# Patient Record
Sex: FEMALE | Employment: FULL TIME | URBAN - METROPOLITAN AREA
[De-identification: names, ages, dates, MRNs, and addresses within clinical notes are randomized per-mention and may not be internally consistent; named-entity substitution may affect disease eponyms.]

---

## 2024-05-15 ENCOUNTER — APPOINTMENT (OUTPATIENT)
Dept: RADIOLOGY | Facility: MEDICAL CENTER | Age: 24
End: 2024-05-15
Attending: EMERGENCY MEDICINE
Payer: MEDICAID

## 2024-05-15 ENCOUNTER — HOSPITAL ENCOUNTER (EMERGENCY)
Facility: MEDICAL CENTER | Age: 24
End: 2024-05-15
Attending: EMERGENCY MEDICINE
Payer: MEDICAID

## 2024-05-15 VITALS
WEIGHT: 265.65 LBS | RESPIRATION RATE: 16 BRPM | TEMPERATURE: 97.5 F | SYSTOLIC BLOOD PRESSURE: 124 MMHG | HEART RATE: 74 BPM | DIASTOLIC BLOOD PRESSURE: 82 MMHG | OXYGEN SATURATION: 99 %

## 2024-05-15 DIAGNOSIS — N93.8 DYSFUNCTIONAL UTERINE BLEEDING: ICD-10-CM

## 2024-05-15 DIAGNOSIS — E28.2 POLYCYSTIC OVARIAN SYNDROME: ICD-10-CM

## 2024-05-15 LAB
ALBUMIN SERPL BCP-MCNC: 3.7 G/DL (ref 3.2–4.9)
ALBUMIN/GLOB SERPL: 1.3 G/DL
ALP SERPL-CCNC: 64 U/L (ref 30–99)
ALT SERPL-CCNC: 22 U/L (ref 2–50)
ANION GAP SERPL CALC-SCNC: 11 MMOL/L (ref 7–16)
AST SERPL-CCNC: 21 U/L (ref 12–45)
BASOPHILS # BLD AUTO: 0.6 % (ref 0–1.8)
BASOPHILS # BLD: 0.07 K/UL (ref 0–0.12)
BILIRUB SERPL-MCNC: <0.2 MG/DL (ref 0.1–1.5)
BUN SERPL-MCNC: 13 MG/DL (ref 8–22)
CALCIUM ALBUM COR SERPL-MCNC: 8.6 MG/DL (ref 8.5–10.5)
CALCIUM SERPL-MCNC: 8.4 MG/DL (ref 8.5–10.5)
CHLORIDE SERPL-SCNC: 108 MMOL/L (ref 96–112)
CO2 SERPL-SCNC: 20 MMOL/L (ref 20–33)
CREAT SERPL-MCNC: 0.89 MG/DL (ref 0.5–1.4)
EOSINOPHIL # BLD AUTO: 0.1 K/UL (ref 0–0.51)
EOSINOPHIL NFR BLD: 0.9 % (ref 0–6.9)
ERYTHROCYTE [DISTWIDTH] IN BLOOD BY AUTOMATED COUNT: 44.1 FL (ref 35.9–50)
GFR SERPLBLD CREATININE-BSD FMLA CKD-EPI: 93 ML/MIN/1.73 M 2
GLOBULIN SER CALC-MCNC: 2.8 G/DL (ref 1.9–3.5)
GLUCOSE SERPL-MCNC: 92 MG/DL (ref 65–99)
HCG SERPL QL: NEGATIVE
HCT VFR BLD AUTO: 42.7 % (ref 37–47)
HGB BLD-MCNC: 13.4 G/DL (ref 12–16)
IMM GRANULOCYTES # BLD AUTO: 0.02 K/UL (ref 0–0.11)
IMM GRANULOCYTES NFR BLD AUTO: 0.2 % (ref 0–0.9)
LYMPHOCYTES # BLD AUTO: 3.99 K/UL (ref 1–4.8)
LYMPHOCYTES NFR BLD: 34 % (ref 22–41)
MCH RBC QN AUTO: 26.7 PG (ref 27–33)
MCHC RBC AUTO-ENTMCNC: 31.4 G/DL (ref 32.2–35.5)
MCV RBC AUTO: 85.2 FL (ref 81.4–97.8)
MONOCYTES # BLD AUTO: 0.59 K/UL (ref 0–0.85)
MONOCYTES NFR BLD AUTO: 5 % (ref 0–13.4)
NEUTROPHILS # BLD AUTO: 6.98 K/UL (ref 1.82–7.42)
NEUTROPHILS NFR BLD: 59.3 % (ref 44–72)
NRBC # BLD AUTO: 0 K/UL
NRBC BLD-RTO: 0 /100 WBC (ref 0–0.2)
PLATELET # BLD AUTO: 345 K/UL (ref 164–446)
PMV BLD AUTO: 9.2 FL (ref 9–12.9)
POTASSIUM SERPL-SCNC: 3.8 MMOL/L (ref 3.6–5.5)
PROT SERPL-MCNC: 6.5 G/DL (ref 6–8.2)
RBC # BLD AUTO: 5.01 M/UL (ref 4.2–5.4)
SODIUM SERPL-SCNC: 139 MMOL/L (ref 135–145)
WBC # BLD AUTO: 11.8 K/UL (ref 4.8–10.8)

## 2024-05-15 PROCEDURE — 99284 EMERGENCY DEPT VISIT MOD MDM: CPT

## 2024-05-15 PROCEDURE — 80053 COMPREHEN METABOLIC PANEL: CPT

## 2024-05-15 PROCEDURE — 84703 CHORIONIC GONADOTROPIN ASSAY: CPT

## 2024-05-15 PROCEDURE — A9270 NON-COVERED ITEM OR SERVICE: HCPCS | Mod: UD | Performed by: EMERGENCY MEDICINE

## 2024-05-15 PROCEDURE — 700102 HCHG RX REV CODE 250 W/ 637 OVERRIDE(OP): Mod: UD | Performed by: EMERGENCY MEDICINE

## 2024-05-15 PROCEDURE — 85025 COMPLETE CBC W/AUTO DIFF WBC: CPT

## 2024-05-15 PROCEDURE — 36415 COLL VENOUS BLD VENIPUNCTURE: CPT

## 2024-05-15 PROCEDURE — 76830 TRANSVAGINAL US NON-OB: CPT

## 2024-05-15 RX ORDER — MEDROXYPROGESTERONE ACETATE 10 MG/1
10 TABLET ORAL DAILY
Qty: 10 TABLET | Refills: 0 | Status: SHIPPED | OUTPATIENT
Start: 2024-05-15

## 2024-05-15 RX ORDER — DROSPIRENONE AND ETHINYL ESTRADIOL 0.02-3(28)
1 KIT ORAL DAILY
COMMUNITY

## 2024-05-15 RX ORDER — MEDROXYPROGESTERONE ACETATE 10 MG/1
10 TABLET ORAL ONCE
Status: COMPLETED | OUTPATIENT
Start: 2024-05-15 | End: 2024-05-15

## 2024-05-15 RX ADMIN — MEDROXYPROGESTERONE ACETATE 10 MG: 10 TABLET ORAL at 22:26

## 2024-05-16 NOTE — DISCHARGE INSTRUCTIONS
Stop the Loryna continue metformin.  Take Provera for 10 days and follow-up with Dr. Huerta in clinic.  Return for uncontrolled bleeding severe pain or fever.

## 2024-05-16 NOTE — ED TRIAGE NOTES
.  Chief Complaint   Patient presents with    Vaginal Bleeding     X 2 weeks      Ambulated to triage. Recent change to medications for polycystic ovary and reports vaginal bleeding began apx 5/3. Pt reports having to change pad or tampon once an hour.

## 2024-05-16 NOTE — ED PROVIDER NOTES
ED Provider Note    CHIEF COMPLAINT  Chief Complaint   Patient presents with    Vaginal Bleeding     X 2 weeks          EXTERNAL RECORDS REVIEWED  None    HPI    Jasvir Maddox is a 23 y.o. G0, P0 LMP in December female who presents to the Emergency Department for heavy vaginal bleeding for 2 weeks.  She is soaking a pad an hour.  The patient has intermittent and variable bleeding since December.  She has seen Dr. Huerta her gynecologist and is being treated for polycystic ovarian syndrome.  She has been treated once with Provera in the past but it was unsuccessful.  No history of hemorrhagic cysts or pelvic infections.  She has minimal abdominal pain.    LIMITATION TO HISTORY   None    OUTSIDE HISTORIAN(S):  None    REVIEW OF SYSTEMS  Pertinent positives include: Vaginal bleeding, abdominal pain.  Pertinent negatives include: Dysuria, urgency, frequency.      PAST MEDICAL HISTORY  Past Medical History:   Diagnosis Date    Polycystic ovary syndrome        SOCIAL HISTORY  Social History     Tobacco Use    Smoking status: Never    Smokeless tobacco: Never   Vaping Use    Vaping status: Never Used   Substance Use Topics    Alcohol use: Not Currently     Comment: rare    Drug use: Never     Social History     Substance and Sexual Activity   Drug Use Never     CURRENT MEDICATIONS  No current facility-administered medications for this encounter.    Current Outpatient Medications:     metFORMIN (GLUCOPHAGE) 500 MG Tab, Take 500 mg by mouth 2 times a day with meals., Disp: , Rfl:     drospirenone-ethinyl estradiol (LORYNA) 3-0.02 MG per tablet, Take 1 Tablet by mouth every day. Indications: Polycystic Ovary Syndrome, Disp: , Rfl:     ALLERGIES  Allergies   Allergen Reactions    Augmentin [Amoxicillin-Pot Clavulanate] Hives    Iodine Contrast [Diagnostic X-Ray Materials] Vomiting    Sulfa Drugs Hives       PHYSICAL EXAM  VITAL SIGNS: BP (!) 134/90   Pulse 85   Temp 36.3 °C (97.3 °F) (Temporal)   Resp 16   Wt  121 kg (265 lb 10.5 oz)   LMP 05/03/2024   SpO2 100%   Reviewed and afebrile, high normal blood pressure  Constitutional: Well developed, Well nourished, well-appearing, elevated BMI.  HENT: Normocephalic, atraumatic, bilateral external ears normal, No intraoral erythema, edema, exudate  Eyes: PERRLA, conjunctiva pink, no scleral icterus.   Cardiovascular: Regular rate and rhythm. No murmurs, rubs or gallops.  No dependent edema or calf tenderness  Respiratory: Lungs clear to auscultation bilaterally. No wheezes, rales, or rhonchi.  Abdominal:  Abdomen soft, non-tender, non distended. No rebound, or guarding.    Skin: No erythema, no rash. No wounds or bruising.  Genitourinary: No costovertebral angle tenderness.  Perineum normal.  Scant blood in the vault without active bleeding os nulliparous and closed  Musculoskeletal: no deformities.   Neurologic: Alert & oriented x 3, cranial nerves 2-12 intact by passive exam.  Moves 4 limbs with symmetric strength.  Psychiatric: Affect normal, Judgment normal, Mood normal.     LABS Ordered and Reviewed by Me:  Results for orders placed or performed during the hospital encounter of 05/15/24   CBC WITH DIFFERENTIAL   Result Value Ref Range    WBC 11.8 (H) 4.8 - 10.8 K/uL    RBC 5.01 4.20 - 5.40 M/uL    Hemoglobin 13.4 12.0 - 16.0 g/dL    Hematocrit 42.7 37.0 - 47.0 %    MCV 85.2 81.4 - 97.8 fL    MCH 26.7 (L) 27.0 - 33.0 pg    MCHC 31.4 (L) 32.2 - 35.5 g/dL    RDW 44.1 35.9 - 50.0 fL    Platelet Count 345 164 - 446 K/uL    MPV 9.2 9.0 - 12.9 fL    Neutrophils-Polys 59.30 44.00 - 72.00 %    Lymphocytes 34.00 22.00 - 41.00 %    Monocytes 5.00 0.00 - 13.40 %    Eosinophils 0.90 0.00 - 6.90 %    Basophils 0.60 0.00 - 1.80 %    Immature Granulocytes 0.20 0.00 - 0.90 %    Nucleated RBC 0.00 0.00 - 0.20 /100 WBC    Neutrophils (Absolute) 6.98 1.82 - 7.42 K/uL    Lymphs (Absolute) 3.99 1.00 - 4.80 K/uL    Monos (Absolute) 0.59 0.00 - 0.85 K/uL    Eos (Absolute) 0.10 0.00 - 0.51 K/uL     Baso (Absolute) 0.07 0.00 - 0.12 K/uL    Immature Granulocytes (abs) 0.02 0.00 - 0.11 K/uL    NRBC (Absolute) 0.00 K/uL   COMP METABOLIC PANEL   Result Value Ref Range    Sodium 139 135 - 145 mmol/L    Potassium 3.8 3.6 - 5.5 mmol/L    Chloride 108 96 - 112 mmol/L    Co2 20 20 - 33 mmol/L    Anion Gap 11.0 7.0 - 16.0    Glucose 92 65 - 99 mg/dL    Bun 13 8 - 22 mg/dL    Creatinine 0.89 0.50 - 1.40 mg/dL    Calcium 8.4 (L) 8.5 - 10.5 mg/dL    Correct Calcium 8.6 8.5 - 10.5 mg/dL    AST(SGOT) 21 12 - 45 U/L    ALT(SGPT) 22 2 - 50 U/L    Alkaline Phosphatase 64 30 - 99 U/L    Total Bilirubin <0.2 0.1 - 1.5 mg/dL    Albumin 3.7 3.2 - 4.9 g/dL    Total Protein 6.5 6.0 - 8.2 g/dL    Globulin 2.8 1.9 - 3.5 g/dL    A-G Ratio 1.3 g/dL   HCG QUAL SERUM   Result Value Ref Range    Beta-Hcg Qualitative Serum Negative Negative   ESTIMATED GFR   Result Value Ref Range    GFR (CKD-EPI) 93 >60 mL/min/1.73 m 2       RADIOLOGY  I have independently interpreted the pelvic ultrasound associated with this visit demonstrating no free fluid.  I am awaiting the final reading from the radiologist.     Final Radiology Report  US-PELVIC COMPLETE (TRANSABDOMINAL/TRANSVAGINAL) (COMBO)   Final Result      1.  Normal appearing uterus with a 9 mm endometrial thickness.   2.  Multiple small ovarian follicles are noted bilaterally.        Radiologist interpretation have been reviewed by me.     ED COURSE:      INTERVENTIONS BY ME:  Medications   medroxyPROGESTERone (Provera) tablet 10 mg (10 mg Oral Given 5/15/24 2226)       ASSESSMENT, COURSE AND PLAN:  PROBLEMS EVALUATED THIS VISIT:    This patient presents with menorrhagia and dysfunctional uterine bleeding.  She has an underlying history of polycystic ovarian syndrome.  There is no pregnancy.  There is no hemorrhagic ovarian cyst.  There is no bleeding diathesis or anemia.      DISPOSITION AND DISCUSSIONS    I have discussed management of the patient with the following physicians and sources:    Dr. Villar [facility] who recommended stopping her hormone treatment and placing her on 10 days of Provera to follow-up with Dr. Huerta in clinic    RISK:  Moderate given need for prescription Provera     PLAN:  Discharge Medication List as of 5/15/2024 10:21 PM        START taking these medications    Details   medroxyPROGESTERone (PROVERA) 10 MG Tab Take 1 Tablet by mouth every day., Disp-10 Tablet, R-0, Normal             Polycystic ovarian syndrome handout given    Return for uncontrolled bleeding dizziness severe pain fever    Followup:  Dr. Huerta    CONDITION: Stable.     FINAL IMPRESSION  1. Dysfunctional uterine bleeding    2. Polycystic ovarian syndrome         Electronically signed by: Jimy Johnston M.D., 5/15/2024 7:52 PM

## 2024-05-16 NOTE — ED NOTES
Patient medicated per MAR, tolerated well. Discharge education provided. Discharge paperwork reviewed with pt. Prescription to be picked up by pt. All questions answered. All belongings with pt. Pt ambulated to lobby unassisted with steady gait.    never

## 2024-08-12 ENCOUNTER — APPOINTMENT (OUTPATIENT)
Dept: RADIOLOGY | Facility: MEDICAL CENTER | Age: 24
End: 2024-08-12
Attending: STUDENT IN AN ORGANIZED HEALTH CARE EDUCATION/TRAINING PROGRAM
Payer: COMMERCIAL

## 2024-08-12 ENCOUNTER — HOSPITAL ENCOUNTER (EMERGENCY)
Facility: MEDICAL CENTER | Age: 24
End: 2024-08-12
Attending: STUDENT IN AN ORGANIZED HEALTH CARE EDUCATION/TRAINING PROGRAM
Payer: COMMERCIAL

## 2024-08-12 VITALS
HEIGHT: 65 IN | TEMPERATURE: 97.9 F | SYSTOLIC BLOOD PRESSURE: 129 MMHG | DIASTOLIC BLOOD PRESSURE: 75 MMHG | BODY MASS INDEX: 44.44 KG/M2 | HEART RATE: 80 BPM | OXYGEN SATURATION: 95 % | WEIGHT: 266.76 LBS | RESPIRATION RATE: 16 BRPM

## 2024-08-12 DIAGNOSIS — M25.473 ANKLE SWELLING, UNSPECIFIED LATERALITY: ICD-10-CM

## 2024-08-12 DIAGNOSIS — M76.61 ACHILLES TENDINITIS OF RIGHT LOWER EXTREMITY: ICD-10-CM

## 2024-08-12 LAB
ALBUMIN SERPL BCP-MCNC: 3.5 G/DL (ref 3.2–4.9)
ALBUMIN/GLOB SERPL: 1.1 G/DL
ALP SERPL-CCNC: 80 U/L (ref 30–99)
ALT SERPL-CCNC: 10 U/L (ref 2–50)
ANION GAP SERPL CALC-SCNC: 11 MMOL/L (ref 7–16)
AST SERPL-CCNC: 14 U/L (ref 12–45)
BASOPHILS # BLD AUTO: 0.4 % (ref 0–1.8)
BASOPHILS # BLD: 0.05 K/UL (ref 0–0.12)
BILIRUB SERPL-MCNC: 0.2 MG/DL (ref 0.1–1.5)
BUN SERPL-MCNC: 12 MG/DL (ref 8–22)
CALCIUM ALBUM COR SERPL-MCNC: 9.1 MG/DL (ref 8.5–10.5)
CALCIUM SERPL-MCNC: 8.7 MG/DL (ref 8.5–10.5)
CHLORIDE SERPL-SCNC: 106 MMOL/L (ref 96–112)
CO2 SERPL-SCNC: 22 MMOL/L (ref 20–33)
CREAT SERPL-MCNC: 0.87 MG/DL (ref 0.5–1.4)
EKG IMPRESSION: NORMAL
EOSINOPHIL # BLD AUTO: 0.12 K/UL (ref 0–0.51)
EOSINOPHIL NFR BLD: 1 % (ref 0–6.9)
ERYTHROCYTE [DISTWIDTH] IN BLOOD BY AUTOMATED COUNT: 46.9 FL (ref 35.9–50)
GFR SERPLBLD CREATININE-BSD FMLA CKD-EPI: 95 ML/MIN/1.73 M 2
GLOBULIN SER CALC-MCNC: 3.1 G/DL (ref 1.9–3.5)
GLUCOSE SERPL-MCNC: 81 MG/DL (ref 65–99)
HCT VFR BLD AUTO: 36.8 % (ref 37–47)
HGB BLD-MCNC: 11.4 G/DL (ref 12–16)
IMM GRANULOCYTES # BLD AUTO: 0.04 K/UL (ref 0–0.11)
IMM GRANULOCYTES NFR BLD AUTO: 0.3 % (ref 0–0.9)
LYMPHOCYTES # BLD AUTO: 3.68 K/UL (ref 1–4.8)
LYMPHOCYTES NFR BLD: 31.5 % (ref 22–41)
MCH RBC QN AUTO: 25.7 PG (ref 27–33)
MCHC RBC AUTO-ENTMCNC: 31 G/DL (ref 32.2–35.5)
MCV RBC AUTO: 83.1 FL (ref 81.4–97.8)
MONOCYTES # BLD AUTO: 0.72 K/UL (ref 0–0.85)
MONOCYTES NFR BLD AUTO: 6.2 % (ref 0–13.4)
NEUTROPHILS # BLD AUTO: 7.09 K/UL (ref 1.82–7.42)
NEUTROPHILS NFR BLD: 60.6 % (ref 44–72)
NRBC # BLD AUTO: 0 K/UL
NRBC BLD-RTO: 0 /100 WBC (ref 0–0.2)
NT-PROBNP SERPL IA-MCNC: <36 PG/ML (ref 0–125)
PLATELET # BLD AUTO: 395 K/UL (ref 164–446)
PMV BLD AUTO: 9.6 FL (ref 9–12.9)
POTASSIUM SERPL-SCNC: 3.6 MMOL/L (ref 3.6–5.5)
PROT SERPL-MCNC: 6.6 G/DL (ref 6–8.2)
RBC # BLD AUTO: 4.43 M/UL (ref 4.2–5.4)
SODIUM SERPL-SCNC: 139 MMOL/L (ref 135–145)
TROPONIN T SERPL-MCNC: 7 NG/L (ref 6–19)
WBC # BLD AUTO: 11.7 K/UL (ref 4.8–10.8)

## 2024-08-12 PROCEDURE — 84484 ASSAY OF TROPONIN QUANT: CPT

## 2024-08-12 PROCEDURE — 36415 COLL VENOUS BLD VENIPUNCTURE: CPT

## 2024-08-12 PROCEDURE — 93005 ELECTROCARDIOGRAM TRACING: CPT | Performed by: STUDENT IN AN ORGANIZED HEALTH CARE EDUCATION/TRAINING PROGRAM

## 2024-08-12 PROCEDURE — 85025 COMPLETE CBC W/AUTO DIFF WBC: CPT

## 2024-08-12 PROCEDURE — 99283 EMERGENCY DEPT VISIT LOW MDM: CPT

## 2024-08-12 PROCEDURE — 80053 COMPREHEN METABOLIC PANEL: CPT

## 2024-08-12 PROCEDURE — 71045 X-RAY EXAM CHEST 1 VIEW: CPT

## 2024-08-12 PROCEDURE — 83880 ASSAY OF NATRIURETIC PEPTIDE: CPT

## 2024-08-12 ASSESSMENT — FIBROSIS 4 INDEX: FIB4 SCORE: 0.31

## 2024-08-13 NOTE — ED PROVIDER NOTES
ED Provider Note    CHIEF COMPLAINT  Chief Complaint   Patient presents with    Leg Swelling     Bilateral lower leg swelling and bilateral calf pain. Denies SOB, decrease in urine output or chest pain.        EXTERNAL RECORDS REVIEWED  1 prior evaluation in the emergency department back in May of this year for dysfunctional uterine bleeding    HPI/ROS  LIMITATION TO HISTORY   Select: : None  OUTSIDE HISTORIAN(S):  None    Jasvir Maddox is a 24 y.o. female with a past medical history of PCOS presenting to the emergency department for intermittent swelling to the ankles bilaterally mild tenderness at the base of the Achilles tendon.  Patient says that she is mainly concerned that she might have heart failure, says that her cousin was exhibiting lower extremity swelling, was ultimately diagnosed with heart failure and subsequently .  She denies any recent trauma.  She does not have any pain to the feet.  She is able to ambulate without difficulty.  Says that she does not notice increased swelling in her legs at the end of the day.  She works as a , says that she does not spend an extensive amount of time on her feet.  She denies any shortness of breath, chest pain, orthopnea, PND.    PAST MEDICAL HISTORY   has a past medical history of Polycystic ovary syndrome.    SURGICAL HISTORY  patient denies any surgical history    FAMILY HISTORY  No family history on file.    SOCIAL HISTORY  Social History     Tobacco Use    Smoking status: Never    Smokeless tobacco: Never   Vaping Use    Vaping status: Never Used   Substance and Sexual Activity    Alcohol use: Not Currently     Comment: rare    Drug use: Never    Sexual activity: Not on file       CURRENT MEDICATIONS  Home Medications       Reviewed by Elder Simpson R.N. (Registered Nurse) on 24 at 2017  Med List Status: Partial     Medication Last Dose Status   drospirenone-ethinyl estradiol (LORYNA) 3-0.02 MG per tablet  Active  "  medroxyPROGESTERone (PROVERA) 10 MG Tab  Active   metFORMIN (GLUCOPHAGE) 500 MG Tab  Active                    ALLERGIES  Allergies   Allergen Reactions    Augmentin [Amoxicillin-Pot Clavulanate] Hives    Iodine Contrast [Diagnostic X-Ray Materials] Vomiting    Sulfa Drugs Hives       PHYSICAL EXAM  VITAL SIGNS: /75   Pulse 80   Temp 36.6 °C (97.9 °F) (Temporal)   Resp 16   Ht 1.651 m (5' 5\")   Wt 121 kg (266 lb 12.1 oz)   LMP 07/21/2024 (Approximate)   SpO2 95%   BMI 44.39 kg/m²    General: Well- appearing , non-toxic, no acute distress  Neuro: oriented x 3, moving all extremities.   HEENT:   - Head: Normocephalic, atraumatic  - Eyes: PERRL  - Ears/Nose: normal external nose and ears  - Mouth: moist mucosal membranes  Resp: clear to auscultation, no increased work of breathing  CV: Regular rate and rhythm  Abd: Soft, non-tender, non-distended  Extremities: No peripheral edema  Right lower extremity: Very mild tenderness palpation of the Achilles tendon insertion on the calcaneus, no tenderness to palpation of the calf.  No asymmetric swelling.  No tenderness to the ankle mortise or foot.  No significant swelling    Left lower extremity: No tenderness palpation of the ankle foot.  No calf swelling or asymmetry.  No tenderness palpation of the calf proximal fibula.    Feet are warm and well-perfused 2+ DP pulses bilaterally.  Sensation intact to light touch.  Dorsiflexion plantarflexion of the great toe remains intact.    Psych: lucid and conversational         DIAGNOSTIC STUDIES / PROCEDURES    EKG  My independent EKG interpretation:  Results for orders placed or performed during the hospital encounter of 08/12/24   EKG (NOW)   Result Value Ref Range    Report       [facility] Emergency Dept.    Test Date:  2024-08-12  Pt Name:    AMANDA SNELL        Department: ER  MRN:        [id number]                      Room:        67  Gender:     Female                       " Technician: [id number]  :        2000                   Requested By:GUSTAVO MATTSON  Order #:    [id number]                    Reading MD: Gustavo Mattson    Measurements  Intervals                                Axis  Rate:       69                           P:          16  KY:         132                          QRS:        50  QRSD:       93                           T:          13  QT:         385  QTc:        413    Interpretive Statements  Sinus rhythm  Baseline wander in lead(s) V2  No acute st or t changes  Electronically Signed On 2024 22:05:56 PDT by Gustavo Mattson         LABS  Results for orders placed or performed during the hospital encounter of 24   CBC WITH DIFFERENTIAL   Result Value Ref Range    WBC 11.7 (H) 4.8 - 10.8 K/uL    RBC 4.43 4.20 - 5.40 M/uL    Hemoglobin 11.4 (L) 12.0 - 16.0 g/dL    Hematocrit 36.8 (L) 37.0 - 47.0 %    MCV 83.1 81.4 - 97.8 fL    MCH 25.7 (L) 27.0 - 33.0 pg    MCHC 31.0 (L) 32.2 - 35.5 g/dL    RDW 46.9 35.9 - 50.0 fL    Platelet Count 395 164 - 446 K/uL    MPV 9.6 9.0 - 12.9 fL    Neutrophils-Polys 60.60 44.00 - 72.00 %    Lymphocytes 31.50 22.00 - 41.00 %    Monocytes 6.20 0.00 - 13.40 %    Eosinophils 1.00 0.00 - 6.90 %    Basophils 0.40 0.00 - 1.80 %    Immature Granulocytes 0.30 0.00 - 0.90 %    Nucleated RBC 0.00 0.00 - 0.20 /100 WBC    Neutrophils (Absolute) 7.09 1.82 - 7.42 K/uL    Lymphs (Absolute) 3.68 1.00 - 4.80 K/uL    Monos (Absolute) 0.72 0.00 - 0.85 K/uL    Eos (Absolute) 0.12 0.00 - 0.51 K/uL    Baso (Absolute) 0.05 0.00 - 0.12 K/uL    Immature Granulocytes (abs) 0.04 0.00 - 0.11 K/uL    NRBC (Absolute) 0.00 K/uL   COMP METABOLIC PANEL   Result Value Ref Range    Sodium 139 135 - 145 mmol/L    Potassium 3.6 3.6 - 5.5 mmol/L    Chloride 106 96 - 112 mmol/L    Co2 22 20 - 33 mmol/L    Anion Gap 11.0 7.0 - 16.0    Glucose 81 65 - 99 mg/dL    Bun 12 8 - 22 mg/dL    Creatinine 0.87 0.50 - 1.40 mg/dL    Calcium 8.7 8.5 - 10.5 mg/dL    Correct  Calcium 9.1 8.5 - 10.5 mg/dL    AST(SGOT) 14 12 - 45 U/L    ALT(SGPT) 10 2 - 50 U/L    Alkaline Phosphatase 80 30 - 99 U/L    Total Bilirubin 0.2 0.1 - 1.5 mg/dL    Albumin 3.5 3.2 - 4.9 g/dL    Total Protein 6.6 6.0 - 8.2 g/dL    Globulin 3.1 1.9 - 3.5 g/dL    A-G Ratio 1.1 g/dL   TROPONIN   Result Value Ref Range    Troponin T 7 6 - 19 ng/L   proBrain Natriuretic Peptide, NT   Result Value Ref Range    NT-proBNP <36 0 - 125 pg/mL   ESTIMATED GFR   Result Value Ref Range    GFR (CKD-EPI) 95 >60 mL/min/1.73 m 2   EKG (NOW)   Result Value Ref Range    Report       [facility] Emergency Dept.    Test Date:  2024  Pt Name:    AMANDA SNELL        Department: ER  MRN:        [id number]                      Room:       [facility]  Gender:     Female                       Technician: [id number]  :        2000                   Requested By:GUSTAVO MATTSON  Order #:    [id number]                    Reading MD: Gustavo Mattson    Measurements  Intervals                                Axis  Rate:       69                           P:          16  HI:         132                          QRS:        50  QRSD:       93                           T:          13  QT:         385  QTc:        413    Interpretive Statements  Sinus rhythm  Baseline wander in lead(s) V2  No acute st or t changes  Electronically Signed On 2024 22:05:56 PDT by Gustavo Mattson         RADIOLOGY  I have independently interpreted the diagnostic imaging associated with this visit and am waiting the final reading from the radiologist.   My preliminary interpretation is as follows:   -   Radiologist interpretation:   DX-CHEST-PORTABLE (1 VIEW)   Final Result         1.  No acute cardiopulmonary disease.              MEDICAL DECISION MAKING      ED COURSE AND PLAN    Amanda Snell is a 24 y.o. female presenting to the emergency department for intermittent ankle swelling.  Patient is mainly concerned because her cousin was  diagnosed with heart failure she is concerned she might have heart failure and this is the presenting symptoms.  She has no symptoms otherwise consistent with heart failure she has no pitting edema in the lower extremities.  Out of an abundance of caution obtain baseline labs, her EKG is unremarkable.  Her chest x-ray shows no cardiomegaly BNP is normal and troponin is negative.  Do not feel that x-rays of the ankles are indicated.  She has no significant swelling, no evidence of an effusion to necessitate arthrocentesis, presentation not consistent with acute fracture or dislocation septic arthritis gout etc.  Patient is appropriate for discharge home, advised to follow-up with her primary care physician.  Potentially consistent with vascular congestion, venous incompetence  Is appropriate for discharge, return precautions discussed.    ---Pertinent ED Course---:    2:57 AM I reviewed the patient's old records in Epic, medication list, allergies, past medical history and performed a physical examination.       Procedures:      ----------------------------------------------------------------------------------  DISCUSSIONS    I have discussed management of the patient with the following physicians and NILSON's:      Discussion of management with other Hospitals in [place] or appropriate source(s):     Escalation of care considered, and ultimately not performed: Considered no indication for x-rays of bilateral ankles, feet, attempted arthrocentesis    Barriers to care at this time, including but not limited to:     Decision tools and prescription drugs considered including, but not limited to:     FINAL IMPRESSION    1. Achilles tendinitis of right lower extremity    2. Ankle swelling, unspecified laterality        Discharge Medication List as of 8/12/2024 10:08 PM            DISPOSITION    Discharge home, Stable        This chart was dictated using an electronic voice recognition software. The chart has been reviewed and edited but  there is still possibility for dictation errors due to limitation of software.    Gustavo Mattson, DO 8/13/2024

## 2024-08-13 NOTE — ED NOTES
[facility] On monitor, call light in reach. Chart up for ERP.

## 2024-08-13 NOTE — DISCHARGE INSTRUCTIONS
You are seen in the emergency department for ankle swelling and mild tenderness to the base of the Achilles tendon.  Your workup for possible heart failure was completely unremarkable, BNP which is very sensitive marker for heart failure was normal.  We believe that the swelling in her ankles likely related to fluid retention in your lower extremities potentially due to venous insufficiency  Please follow-up with your primary care physician

## 2024-08-13 NOTE — ED TRIAGE NOTES
Jasvir Shields [place] Bell  24 y.o. female  Chief Complaint   Patient presents with    Leg Swelling     Bilateral lower leg swelling and bilateral calf pain. Denies SOB, decrease in urine output or chest pain.      Pt ambulatory to triage with steady gait for above complaint.     Pt is GCS 15, speaking in full sentences, follows commands and responds appropriately to questions. Resp are even and unlabored.    Pt placed in ED lobby. Pt educated on triage process. Pt encouraged to alert staff for any changes.       Vitals:    08/12/24 2005   BP: (!) 152/99   Pulse: 74   Resp: 18   Temp: 36.4 °C (97.5 °F)   SpO2: 100%

## 2024-10-27 ENCOUNTER — PHARMACY VISIT (OUTPATIENT)
Dept: PHARMACY | Facility: MEDICAL CENTER | Age: 24
End: 2024-10-27
Payer: COMMERCIAL

## 2024-10-27 ENCOUNTER — APPOINTMENT (OUTPATIENT)
Dept: RADIOLOGY | Facility: MEDICAL CENTER | Age: 24
End: 2024-10-27
Attending: STUDENT IN AN ORGANIZED HEALTH CARE EDUCATION/TRAINING PROGRAM
Payer: COMMERCIAL

## 2024-10-27 ENCOUNTER — HOSPITAL ENCOUNTER (EMERGENCY)
Facility: MEDICAL CENTER | Age: 24
End: 2024-10-27
Attending: STUDENT IN AN ORGANIZED HEALTH CARE EDUCATION/TRAINING PROGRAM
Payer: COMMERCIAL

## 2024-10-27 VITALS
RESPIRATION RATE: 18 BRPM | SYSTOLIC BLOOD PRESSURE: 139 MMHG | WEIGHT: 265.88 LBS | DIASTOLIC BLOOD PRESSURE: 77 MMHG | HEART RATE: 88 BPM | OXYGEN SATURATION: 97 % | BODY MASS INDEX: 44.3 KG/M2 | HEIGHT: 65 IN | TEMPERATURE: 98 F

## 2024-10-27 DIAGNOSIS — R11.0 NAUSEA: ICD-10-CM

## 2024-10-27 DIAGNOSIS — Z32.01 PREGNANCY EXAMINATION OR TEST, POSITIVE RESULT: Primary | ICD-10-CM

## 2024-10-27 LAB
ALBUMIN SERPL BCP-MCNC: 3.7 G/DL (ref 3.2–4.9)
ALBUMIN/GLOB SERPL: 1 G/DL
ALP SERPL-CCNC: 80 U/L (ref 30–99)
ALT SERPL-CCNC: 11 U/L (ref 2–50)
ANION GAP SERPL CALC-SCNC: 9 MMOL/L (ref 7–16)
APPEARANCE UR: CLEAR
AST SERPL-CCNC: 16 U/L (ref 12–45)
B-HCG SERPL-ACNC: 519 MIU/ML (ref 0–5)
BASOPHILS # BLD AUTO: 0.6 % (ref 0–1.8)
BASOPHILS # BLD: 0.07 K/UL (ref 0–0.12)
BILIRUB SERPL-MCNC: <0.2 MG/DL (ref 0.1–1.5)
BILIRUB UR QL STRIP.AUTO: NEGATIVE
BUN SERPL-MCNC: 14 MG/DL (ref 8–22)
CALCIUM ALBUM COR SERPL-MCNC: 9.2 MG/DL (ref 8.5–10.5)
CALCIUM SERPL-MCNC: 9 MG/DL (ref 8.5–10.5)
CHLORIDE SERPL-SCNC: 106 MMOL/L (ref 96–112)
CO2 SERPL-SCNC: 21 MMOL/L (ref 20–33)
COLOR UR: YELLOW
CREAT SERPL-MCNC: 1.2 MG/DL (ref 0.5–1.4)
EOSINOPHIL # BLD AUTO: 0.08 K/UL (ref 0–0.51)
EOSINOPHIL NFR BLD: 0.7 % (ref 0–6.9)
ERYTHROCYTE [DISTWIDTH] IN BLOOD BY AUTOMATED COUNT: 50 FL (ref 35.9–50)
GFR SERPLBLD CREATININE-BSD FMLA CKD-EPI: 65 ML/MIN/1.73 M 2
GLOBULIN SER CALC-MCNC: 3.7 G/DL (ref 1.9–3.5)
GLUCOSE SERPL-MCNC: 92 MG/DL (ref 65–99)
GLUCOSE UR STRIP.AUTO-MCNC: NEGATIVE MG/DL
HCT VFR BLD AUTO: 36.3 % (ref 37–47)
HGB BLD-MCNC: 11.4 G/DL (ref 12–16)
IMM GRANULOCYTES # BLD AUTO: 0.03 K/UL (ref 0–0.11)
IMM GRANULOCYTES NFR BLD AUTO: 0.2 % (ref 0–0.9)
KETONES UR STRIP.AUTO-MCNC: ABNORMAL MG/DL
LEUKOCYTE ESTERASE UR QL STRIP.AUTO: NEGATIVE
LIPASE SERPL-CCNC: 26 U/L (ref 11–82)
LYMPHOCYTES # BLD AUTO: 3.41 K/UL (ref 1–4.8)
LYMPHOCYTES NFR BLD: 28 % (ref 22–41)
MCH RBC QN AUTO: 25.3 PG (ref 27–33)
MCHC RBC AUTO-ENTMCNC: 31.4 G/DL (ref 32.2–35.5)
MCV RBC AUTO: 80.5 FL (ref 81.4–97.8)
MICRO URNS: ABNORMAL
MONOCYTES # BLD AUTO: 0.77 K/UL (ref 0–0.85)
MONOCYTES NFR BLD AUTO: 6.3 % (ref 0–13.4)
NEUTROPHILS # BLD AUTO: 7.83 K/UL (ref 1.82–7.42)
NEUTROPHILS NFR BLD: 64.2 % (ref 44–72)
NITRITE UR QL STRIP.AUTO: NEGATIVE
NRBC # BLD AUTO: 0 K/UL
NRBC BLD-RTO: 0 /100 WBC (ref 0–0.2)
NUMBER OF RH DOSES IND 8505RD: NORMAL
PH UR STRIP.AUTO: 6 [PH] (ref 5–8)
PLATELET # BLD AUTO: 429 K/UL (ref 164–446)
PMV BLD AUTO: 9.2 FL (ref 9–12.9)
POTASSIUM SERPL-SCNC: 3.5 MMOL/L (ref 3.6–5.5)
PROT SERPL-MCNC: 7.4 G/DL (ref 6–8.2)
PROT UR QL STRIP: NEGATIVE MG/DL
RBC # BLD AUTO: 4.51 M/UL (ref 4.2–5.4)
RBC UR QL AUTO: NEGATIVE
RH BLD: NORMAL
SODIUM SERPL-SCNC: 136 MMOL/L (ref 135–145)
SP GR UR STRIP.AUTO: 1.02
UROBILINOGEN UR STRIP.AUTO-MCNC: 1 EU/DL
WBC # BLD AUTO: 12.2 K/UL (ref 4.8–10.8)

## 2024-10-27 PROCEDURE — 85025 COMPLETE CBC W/AUTO DIFF WBC: CPT

## 2024-10-27 PROCEDURE — 86901 BLOOD TYPING SEROLOGIC RH(D): CPT

## 2024-10-27 PROCEDURE — 81003 URINALYSIS AUTO W/O SCOPE: CPT

## 2024-10-27 PROCEDURE — 36415 COLL VENOUS BLD VENIPUNCTURE: CPT

## 2024-10-27 PROCEDURE — 76817 TRANSVAGINAL US OBSTETRIC: CPT

## 2024-10-27 PROCEDURE — RXMED WILLOW AMBULATORY MEDICATION CHARGE: Performed by: STUDENT IN AN ORGANIZED HEALTH CARE EDUCATION/TRAINING PROGRAM

## 2024-10-27 PROCEDURE — 84702 CHORIONIC GONADOTROPIN TEST: CPT

## 2024-10-27 PROCEDURE — 83690 ASSAY OF LIPASE: CPT

## 2024-10-27 PROCEDURE — 80053 COMPREHEN METABOLIC PANEL: CPT

## 2024-10-27 PROCEDURE — 99284 EMERGENCY DEPT VISIT MOD MDM: CPT

## 2024-10-27 RX ORDER — PNV NO.95/FERROUS FUM/FOLIC AC 28MG-0.8MG
1 TABLET ORAL DAILY
Qty: 30 TABLET | Refills: 0 | Status: SHIPPED | OUTPATIENT
Start: 2024-10-27

## 2024-10-27 ASSESSMENT — FIBROSIS 4 INDEX: FIB4 SCORE: 0.27

## 2024-10-29 ENCOUNTER — HOSPITAL ENCOUNTER (EMERGENCY)
Facility: MEDICAL CENTER | Age: 24
End: 2024-10-29
Attending: EMERGENCY MEDICINE
Payer: COMMERCIAL

## 2024-10-29 VITALS
OXYGEN SATURATION: 98 % | RESPIRATION RATE: 16 BRPM | TEMPERATURE: 97.3 F | DIASTOLIC BLOOD PRESSURE: 84 MMHG | SYSTOLIC BLOOD PRESSURE: 140 MMHG | WEIGHT: 266.76 LBS | HEART RATE: 80 BPM | BODY MASS INDEX: 44.44 KG/M2 | HEIGHT: 65 IN

## 2024-10-29 DIAGNOSIS — Z34.90 EARLY STAGE OF PREGNANCY: ICD-10-CM

## 2024-10-29 LAB — B-HCG SERPL-ACNC: 1089 MIU/ML (ref 0–5)

## 2024-10-29 PROCEDURE — 36415 COLL VENOUS BLD VENIPUNCTURE: CPT

## 2024-10-29 PROCEDURE — 99283 EMERGENCY DEPT VISIT LOW MDM: CPT

## 2024-10-29 PROCEDURE — 84702 CHORIONIC GONADOTROPIN TEST: CPT

## 2024-10-29 ASSESSMENT — FIBROSIS 4 INDEX: FIB4 SCORE: 0.27

## 2024-10-29 ASSESSMENT — PAIN DESCRIPTION - PAIN TYPE: TYPE: ACUTE PAIN

## 2024-12-12 ENCOUNTER — HOSPITAL ENCOUNTER (EMERGENCY)
Facility: MEDICAL CENTER | Age: 24
End: 2024-12-12
Attending: EMERGENCY MEDICINE
Payer: COMMERCIAL

## 2024-12-12 VITALS
BODY MASS INDEX: 44.19 KG/M2 | HEIGHT: 65 IN | OXYGEN SATURATION: 98 % | WEIGHT: 265.21 LBS | DIASTOLIC BLOOD PRESSURE: 75 MMHG | TEMPERATURE: 98.3 F | RESPIRATION RATE: 18 BRPM | SYSTOLIC BLOOD PRESSURE: 137 MMHG | HEART RATE: 77 BPM

## 2024-12-12 DIAGNOSIS — Z34.91 FIRST TRIMESTER PREGNANCY: ICD-10-CM

## 2024-12-12 DIAGNOSIS — J06.9 UPPER RESPIRATORY TRACT INFECTION, UNSPECIFIED TYPE: ICD-10-CM

## 2024-12-12 LAB
FLUAV RNA SPEC QL NAA+PROBE: NEGATIVE
FLUBV RNA SPEC QL NAA+PROBE: NEGATIVE
RSV RNA SPEC QL NAA+PROBE: NEGATIVE
S PYO DNA SPEC NAA+PROBE: NOT DETECTED
SARS-COV-2 RNA RESP QL NAA+PROBE: NOTDETECTED

## 2024-12-12 PROCEDURE — 87651 STREP A DNA AMP PROBE: CPT

## 2024-12-12 PROCEDURE — 700102 HCHG RX REV CODE 250 W/ 637 OVERRIDE(OP): Mod: UD | Performed by: EMERGENCY MEDICINE

## 2024-12-12 PROCEDURE — 0241U HCHG SARS-COV-2 COVID-19 NFCT DS RESP RNA 4 TRGT ED POC: CPT

## 2024-12-12 PROCEDURE — 99283 EMERGENCY DEPT VISIT LOW MDM: CPT

## 2024-12-12 PROCEDURE — A9270 NON-COVERED ITEM OR SERVICE: HCPCS | Mod: UD | Performed by: EMERGENCY MEDICINE

## 2024-12-12 RX ORDER — ACETAMINOPHEN 500 MG
1000 TABLET ORAL ONCE
Status: COMPLETED | OUTPATIENT
Start: 2024-12-12 | End: 2024-12-12

## 2024-12-12 RX ADMIN — ACETAMINOPHEN 1000 MG: 500 TABLET ORAL at 08:35

## 2024-12-12 ASSESSMENT — FIBROSIS 4 INDEX: FIB4 SCORE: 0.27

## 2024-12-12 NOTE — DISCHARGE INSTRUCTIONS
Although up with primary care and/or OB/GYN for reevaluation and blood pressure monitoring as your blood pressure was elevated in the emergency department.    Tylenol 1000 mg every 6 hours as needed for fever, body aches or other discomfort.    OTC medications as recommended by OB/GYN yesterday as needed for cold and flu symptoms.    A cool-mist humidifier is quite beneficial for cough and congestion as well.    Encourage oral fluid hydration.  Diet and light activity as tolerated.    Return to the emergency department for intractable fever, altered mental status, difficulty breathing/wheezing/retractions, chest pain, syncope, vomiting or for any abdominal pain or cramping, vaginal bleeding or other new concerns.

## 2024-12-12 NOTE — ED TRIAGE NOTES
Chief Complaint   Patient presents with    Flu Like Symptoms     On/off fever, throat pain, sneezing, nasal congestion since Monday    Pregnancy     11 weeks       Pain: 0/10    Pt came in to triage ambulatory with steady gait for the above complaints.     Pt is alert and oriented x 4, speaking in full sentences, follows commands and responds appropriately to questions.     Respirations are even and unlabored.    Pt placed in lobby. Pt educated on triage process.     Pt encouraged to inform staff for any changes in condition or if needs help while waiting to be room in.    Vitals:    12/12/24 0644   BP: (!) 140/84   Pulse: 86   Resp: 16   Temp: (!) 35.8 °C (96.4 °F)   SpO2: 97%

## 2024-12-12 NOTE — ED PROVIDER NOTES
ED Provider Note    CHIEF COMPLAINT  Chief Complaint   Patient presents with    Flu Like Symptoms     On/off fever, throat pain, sneezing, nasal congestion since Monday    Pregnancy     11 weeks       EXTERNAL RECORDS REVIEWED  Other noncontributory    HPI/ROS  LIMITATION TO HISTORY   Select: : None  OUTSIDE HISTORIAN(S):  None    Jasvir Maddox is a 24 y.o. female who presents to the emergency department through triage for cold and flu symptoms.  Patient describes now 4 days of nasal congestion, rhinorrhea, sneezing, sore throat and nonproductive cough.  Fever up to 100.8.  General fatigue.  Tylenol Monday without much relief.  Spoke to her OB/GYN, Dr. Huerta, who recommended Robitussin or Sudafed for her symptoms.  Patient tried Robitussin without relief yesterday.  States her nose hurts from so much blowing.  No epistaxis.  No difficulty breathing.  No vomiting.  Decreased appetite but tolerating food and fluids.    Patient states she is 11 weeks pregnant, followed by OB/GYN.  Previous ultrasound confirming intrauterine pregnancy at the doctor's office per patient.  Denies any abdominal pain, cramping, vaginal bleeding or leakage of fluid.  Denies urinary frequency, dysuria, urgency.    PAST MEDICAL HISTORY   has a past medical history of Asthma and Polycystic ovary syndrome.    SURGICAL HISTORY  patient denies any surgical history    FAMILY HISTORY  History reviewed. No pertinent family history.    SOCIAL HISTORY  Social History     Tobacco Use    Smoking status: Never    Smokeless tobacco: Never   Vaping Use    Vaping status: Never Used   Substance and Sexual Activity    Alcohol use: Not Currently     Comment: rare    Drug use: Yes     Types: Inhaled     Comment: marijuana    Sexual activity: Not on file       CURRENT MEDICATIONS  Home Medications       Reviewed by Della Donahue R.N. (Registered Nurse) on 12/12/24 at 0652  Med List Status: Partial     Medication Last Dose Status  "  drospirenone-ethinyl estradiol (LORYNA) 3-0.02 MG per tablet  Active   medroxyPROGESTERone (PROVERA) 10 MG Tab  Active   metFORMIN (GLUCOPHAGE) 500 MG Tab  Active   Prenatal Vit-Fe Fumarate-FA (PRENATAL VITAMINS) 28-0.8 MG Tab  Active                    ALLERGIES  Allergies   Allergen Reactions    Augmentin [Amoxicillin-Pot Clavulanate] Hives    Iodine Contrast [Diagnostic X-Ray Materials] Vomiting    Sulfa Drugs Hives       PHYSICAL EXAM  VITAL SIGNS: BP (!) 140/84   Pulse 86   Temp (!) 35.8 °C (96.4 °F) (Temporal)   Resp 16   Ht 1.651 m (5' 5\")   Wt 120 kg (265 lb 3.4 oz)   LMP 09/27/2024 (Exact Date) Comment: Positive pregnancy on pregnancy home kit  SpO2 97%   BMI 44.13 kg/m²    Pulse ox interpretation: I interpret this pulse ox as normal.  Constitutional: Alert in no apparent distress.  HENT: Normocephalic, atraumatic. Bilateral external ears normal, Nose normal.  Audible nasal congestion.  Moist mucous membranes.  Oropharynx with diffuse erythema, no exudate or edema.  Uvula midline.  Tolerating secretions.  No stridor or dysphonia.  Eyes: Pupils are equal and reactive, Conjunctiva normal.   Neck: Normal range of motion, Supple.  No stridor or dysphonia.  No meningeal irritation.  Lymphatic: No lymphadenopathy noted.  No cervical or submandibular lymphadenopathy.  Cardiovascular: Regular rate and rhythm, no murmurs. Distal pulses intact.    Thorax & Lungs: Normal breath sounds.  No wheezing/rales/ronchi. No increased work of breathing, clipped speech or retractions.   Abdomen: Obese, soft, nondistended nontender to palpation.  No palpable fundus.  Skin: Warm, Dry, No erythema, No rash.   Musculoskeletal: Good range of motion in all major joints.   Neurologic: Alert and orient x 4.  Speech clear and cohesive.  Psychiatric: Affect normal, Judgment normal, Mood normal.       EKG/LABS  Results for orders placed or performed during the hospital encounter of 12/12/24   POC CoV-2, FLU A/B, RSV by PCR    " Collection Time: 12/12/24  7:00 AM   Result Value Ref Range    POC Influenza A RNA, PCR Negative Negative    POC Influenza B RNA, PCR Negative Negative    POC RSV, by PCR Negative Negative    POC SARS-CoV-2, PCR NotDetected NotDetected   Group A Strep by PCR    Collection Time: 12/12/24  8:38 AM    Specimen: Throat   Result Value Ref Range    Group A Strep by PCR Not Detected Not Detected       COURSE & MEDICAL DECISION MAKING    ASSESSMENT, COURSE AND PLAN  Care Narrative:   Evaluation most suggestive of viral upper respiratory infection.  No clinical evidence for otitis media, pharyngitis, sinusitis, meningitis or pneumonia.  RSV, COVID, influenza and strep negative.  Hemodynamically stable without fever, tachycardia, hypotension or hypoxia.  Pregnancy appropriate OTC medications recommended for symptom control.  Patient did speak with OB/GYN yesterday who gave her a list of appropriate medications.  Tylenol today with improvement.  Patient's blood pressure is elevated in the emergency department, however she still first trimester, inconsistent with preeclampsia, but will follow-up with OB/GYN for monitoring.  Denies any other pregnancy related concerns.    ADDITIONAL PROBLEMS MANAGED  First trimester pregnancy    DISPOSITION AND DISCUSSIONS    Discussion of management with other QHP or appropriate source(s): None     Escalation of care considered, and ultimately not performed: None    Decision tools and prescription drugs considered including, but not limited to: Antibiotics not indicated and presumed viral illness    The patient is stable for discharge home, anticipatory guidance provided, close follow-up is encouraged and strict return instructions have been discussed. Patient is agreeable to the disposition and plan.  .    FINAL DIAGNOSIS  1. Upper respiratory tract infection, unspecified type    2. First trimester pregnancy         Electronically signed by: Vicki Day D.O., 12/12/2024 10:19 AM

## 2024-12-31 ENCOUNTER — APPOINTMENT (OUTPATIENT)
Dept: RADIOLOGY | Facility: MEDICAL CENTER | Age: 24
End: 2024-12-31
Attending: EMERGENCY MEDICINE
Payer: COMMERCIAL

## 2024-12-31 ENCOUNTER — HOSPITAL ENCOUNTER (EMERGENCY)
Facility: MEDICAL CENTER | Age: 24
End: 2025-01-01
Attending: EMERGENCY MEDICINE
Payer: COMMERCIAL

## 2024-12-31 DIAGNOSIS — R10.9 ABDOMINAL PAIN DURING INTRAUTERINE PREGNANCY: ICD-10-CM

## 2024-12-31 DIAGNOSIS — O26.899 ABDOMINAL PAIN DURING INTRAUTERINE PREGNANCY: ICD-10-CM

## 2024-12-31 DIAGNOSIS — R10.9 ABDOMINAL PAIN DURING PREGNANCY, ANTEPARTUM: ICD-10-CM

## 2024-12-31 DIAGNOSIS — D72.829 LEUKOCYTOSIS, UNSPECIFIED TYPE: ICD-10-CM

## 2024-12-31 DIAGNOSIS — O26.899 ABDOMINAL PAIN DURING PREGNANCY, ANTEPARTUM: ICD-10-CM

## 2024-12-31 LAB
ALBUMIN SERPL BCP-MCNC: 3.4 G/DL (ref 3.2–4.9)
ALBUMIN/GLOB SERPL: 0.9 G/DL
ALP SERPL-CCNC: 73 U/L (ref 30–99)
ALT SERPL-CCNC: 12 U/L (ref 2–50)
ANION GAP SERPL CALC-SCNC: 13 MMOL/L (ref 7–16)
APPEARANCE UR: CLEAR
AST SERPL-CCNC: 11 U/L (ref 12–45)
B-HCG SERPL-ACNC: ABNORMAL MIU/ML (ref 0–5)
BASOPHILS # BLD AUTO: 0.2 % (ref 0–1.8)
BASOPHILS # BLD: 0.03 K/UL (ref 0–0.12)
BILIRUB SERPL-MCNC: <0.2 MG/DL (ref 0.1–1.5)
BILIRUB UR QL STRIP.AUTO: NEGATIVE
BUN SERPL-MCNC: 7 MG/DL (ref 8–22)
CALCIUM ALBUM COR SERPL-MCNC: 9.7 MG/DL (ref 8.5–10.5)
CALCIUM SERPL-MCNC: 9.2 MG/DL (ref 8.5–10.5)
CHLORIDE SERPL-SCNC: 101 MMOL/L (ref 96–112)
CO2 SERPL-SCNC: 20 MMOL/L (ref 20–33)
COLOR UR: YELLOW
CREAT SERPL-MCNC: 0.64 MG/DL (ref 0.5–1.4)
EOSINOPHIL # BLD AUTO: 0.08 K/UL (ref 0–0.51)
EOSINOPHIL NFR BLD: 0.7 % (ref 0–6.9)
ERYTHROCYTE [DISTWIDTH] IN BLOOD BY AUTOMATED COUNT: 45.5 FL (ref 35.9–50)
GFR SERPLBLD CREATININE-BSD FMLA CKD-EPI: 126 ML/MIN/1.73 M 2
GLOBULIN SER CALC-MCNC: 3.7 G/DL (ref 1.9–3.5)
GLUCOSE SERPL-MCNC: 105 MG/DL (ref 65–99)
GLUCOSE UR STRIP.AUTO-MCNC: NEGATIVE MG/DL
HCT VFR BLD AUTO: 36 % (ref 37–47)
HGB BLD-MCNC: 11.7 G/DL (ref 12–16)
IMM GRANULOCYTES # BLD AUTO: 0.05 K/UL (ref 0–0.11)
IMM GRANULOCYTES NFR BLD AUTO: 0.4 % (ref 0–0.9)
KETONES UR STRIP.AUTO-MCNC: ABNORMAL MG/DL
LEUKOCYTE ESTERASE UR QL STRIP.AUTO: NEGATIVE
LIPASE SERPL-CCNC: 22 U/L (ref 11–82)
LYMPHOCYTES # BLD AUTO: 2.8 K/UL (ref 1–4.8)
LYMPHOCYTES NFR BLD: 23.1 % (ref 22–41)
MCH RBC QN AUTO: 26.6 PG (ref 27–33)
MCHC RBC AUTO-ENTMCNC: 32.5 G/DL (ref 32.2–35.5)
MCV RBC AUTO: 81.8 FL (ref 81.4–97.8)
MICRO URNS: ABNORMAL
MONOCYTES # BLD AUTO: 0.66 K/UL (ref 0–0.85)
MONOCYTES NFR BLD AUTO: 5.4 % (ref 0–13.4)
NEUTROPHILS # BLD AUTO: 8.51 K/UL (ref 1.82–7.42)
NEUTROPHILS NFR BLD: 70.2 % (ref 44–72)
NITRITE UR QL STRIP.AUTO: NEGATIVE
NRBC # BLD AUTO: 0 K/UL
NRBC BLD-RTO: 0 /100 WBC (ref 0–0.2)
NUMBER OF RH DOSES IND 8505RD: NORMAL
PH UR STRIP.AUTO: 6 [PH] (ref 5–8)
PLATELET # BLD AUTO: 355 K/UL (ref 164–446)
PMV BLD AUTO: 9.1 FL (ref 9–12.9)
POTASSIUM SERPL-SCNC: 3.6 MMOL/L (ref 3.6–5.5)
PROT SERPL-MCNC: 7.1 G/DL (ref 6–8.2)
PROT UR QL STRIP: NEGATIVE MG/DL
RBC # BLD AUTO: 4.4 M/UL (ref 4.2–5.4)
RBC UR QL AUTO: NEGATIVE
RH BLD: NORMAL
SODIUM SERPL-SCNC: 134 MMOL/L (ref 135–145)
SP GR UR STRIP.AUTO: 1.01
UROBILINOGEN UR STRIP.AUTO-MCNC: 1 EU/DL
WBC # BLD AUTO: 12.1 K/UL (ref 4.8–10.8)

## 2024-12-31 PROCEDURE — 99284 EMERGENCY DEPT VISIT MOD MDM: CPT

## 2024-12-31 PROCEDURE — 76801 OB US < 14 WKS SINGLE FETUS: CPT

## 2024-12-31 PROCEDURE — 36415 COLL VENOUS BLD VENIPUNCTURE: CPT

## 2024-12-31 PROCEDURE — 81003 URINALYSIS AUTO W/O SCOPE: CPT

## 2024-12-31 PROCEDURE — 80053 COMPREHEN METABOLIC PANEL: CPT

## 2024-12-31 PROCEDURE — 86901 BLOOD TYPING SEROLOGIC RH(D): CPT

## 2024-12-31 PROCEDURE — 84702 CHORIONIC GONADOTROPIN TEST: CPT

## 2024-12-31 PROCEDURE — 85025 COMPLETE CBC W/AUTO DIFF WBC: CPT

## 2024-12-31 PROCEDURE — 83690 ASSAY OF LIPASE: CPT

## 2024-12-31 ASSESSMENT — FIBROSIS 4 INDEX: FIB4 SCORE: 0.27

## 2024-12-31 ASSESSMENT — PAIN DESCRIPTION - PAIN TYPE: TYPE: ACUTE PAIN

## 2025-01-01 ENCOUNTER — APPOINTMENT (OUTPATIENT)
Dept: RADIOLOGY | Facility: MEDICAL CENTER | Age: 25
End: 2025-01-01
Attending: EMERGENCY MEDICINE
Payer: COMMERCIAL

## 2025-01-01 VITALS
WEIGHT: 266.1 LBS | RESPIRATION RATE: 18 BRPM | HEIGHT: 65 IN | HEART RATE: 94 BPM | DIASTOLIC BLOOD PRESSURE: 81 MMHG | BODY MASS INDEX: 44.33 KG/M2 | OXYGEN SATURATION: 93 % | SYSTOLIC BLOOD PRESSURE: 149 MMHG | TEMPERATURE: 96.6 F

## 2025-01-01 PROCEDURE — 74181 MRI ABDOMEN W/O CONTRAST: CPT

## 2025-01-01 PROCEDURE — 76705 ECHO EXAM OF ABDOMEN: CPT

## 2025-01-01 NOTE — DISCHARGE INSTRUCTIONS
Your MRI today did not show any significant problems within your abdomen.  We recommend follow-up with your primary care and OB/GYN.  Return to the emergency department if anything worsens.

## 2025-01-01 NOTE — PROGRESS NOTES
"ED Observation Progress Note    Date of Service: 01/01/25    Interval History and Interventions  This is a 13-week pregnant woman who presented to the ER for evaluation of abdominal pain.  So far labs have been overall unremarkable, mild leukocytosis 12.1.  Ultrasound shows IUP.  Right upper quadrant ultrasound without biliary pathology.  Pending MRI abdomen    MRI obtained and did not show any acute abnormalities, no appendicitis.  Patient only having mild pain at this time.  Felt comfortable being discharged home to follow-up with outpatient physicians.  Discharged home in stable condition    Physical Exam  BP (!) 149/81   Pulse 94   Temp 35.9 °C (96.6 °F) (Temporal)   Resp 18   Ht 1.651 m (5' 5\")   Wt 121 kg (266 lb 1.5 oz)   LMP 09/27/2024 (Exact Date) Comment: Positive pregnancy on pregnancy home kit  SpO2 93%   BMI 44.28 kg/m² .    Constitutional: Awake and alert. Nontoxic  HENT:  Grossly normal  Eyes: Grossly normal  Neck: Normal range of motion  Cardiovascular: Normal heart rate   Thorax & Lungs: No respiratory distress  Abdomen: Nondistended  Skin:  No pathologic rash.   Extremities: Well perfused  Psychiatric: Affect normal    Labs  Results for orders placed or performed during the hospital encounter of 12/31/24   CBC WITH DIFFERENTIAL    Collection Time: 12/31/24 10:00 PM   Result Value Ref Range    WBC 12.1 (H) 4.8 - 10.8 K/uL    RBC 4.40 4.20 - 5.40 M/uL    Hemoglobin 11.7 (L) 12.0 - 16.0 g/dL    Hematocrit 36.0 (L) 37.0 - 47.0 %    MCV 81.8 81.4 - 97.8 fL    MCH 26.6 (L) 27.0 - 33.0 pg    MCHC 32.5 32.2 - 35.5 g/dL    RDW 45.5 35.9 - 50.0 fL    Platelet Count 355 164 - 446 K/uL    MPV 9.1 9.0 - 12.9 fL    Neutrophils-Polys 70.20 44.00 - 72.00 %    Lymphocytes 23.10 22.00 - 41.00 %    Monocytes 5.40 0.00 - 13.40 %    Eosinophils 0.70 0.00 - 6.90 %    Basophils 0.20 0.00 - 1.80 %    Immature Granulocytes 0.40 0.00 - 0.90 %    Nucleated RBC 0.00 0.00 - 0.20 /100 WBC    Neutrophils (Absolute) 8.51 " (H) 1.82 - 7.42 K/uL    Lymphs (Absolute) 2.80 1.00 - 4.80 K/uL    Monos (Absolute) 0.66 0.00 - 0.85 K/uL    Eos (Absolute) 0.08 0.00 - 0.51 K/uL    Baso (Absolute) 0.03 0.00 - 0.12 K/uL    Immature Granulocytes (abs) 0.05 0.00 - 0.11 K/uL    NRBC (Absolute) 0.00 K/uL   COMP METABOLIC PANEL    Collection Time: 12/31/24 10:00 PM   Result Value Ref Range    Sodium 134 (L) 135 - 145 mmol/L    Potassium 3.6 3.6 - 5.5 mmol/L    Chloride 101 96 - 112 mmol/L    Co2 20 20 - 33 mmol/L    Anion Gap 13.0 7.0 - 16.0    Glucose 105 (H) 65 - 99 mg/dL    Bun 7 (L) 8 - 22 mg/dL    Creatinine 0.64 0.50 - 1.40 mg/dL    Calcium 9.2 8.5 - 10.5 mg/dL    Correct Calcium 9.7 8.5 - 10.5 mg/dL    AST(SGOT) 11 (L) 12 - 45 U/L    ALT(SGPT) 12 2 - 50 U/L    Alkaline Phosphatase 73 30 - 99 U/L    Total Bilirubin <0.2 0.1 - 1.5 mg/dL    Albumin 3.4 3.2 - 4.9 g/dL    Total Protein 7.1 6.0 - 8.2 g/dL    Globulin 3.7 (H) 1.9 - 3.5 g/dL    A-G Ratio 0.9 g/dL   LIPASE    Collection Time: 12/31/24 10:00 PM   Result Value Ref Range    Lipase 22 11 - 82 U/L   HCG QUANTITATIVE    Collection Time: 12/31/24 10:00 PM   Result Value Ref Range    Bhcg 68288.0 (H) 0.0 - 5.0 mIU/mL   RH Type for Rhogam from E.D.    Collection Time: 12/31/24 10:00 PM   Result Value Ref Range    Emergency Department Rh Typing POS     Number Of Rh Doses Indicated ZERO    ESTIMATED GFR    Collection Time: 12/31/24 10:00 PM   Result Value Ref Range    GFR (CKD-EPI) 126 >60 mL/min/1.73 m 2   URINALYSIS,CULTURE IF INDICATED    Collection Time: 12/31/24 11:06 PM    Specimen: Urine, Clean Catch   Result Value Ref Range    Color Yellow     Character Clear     Specific Gravity 1.014 <1.035    Ph 6.0 5.0 - 8.0    Glucose Negative Negative mg/dL    Ketones Trace (A) Negative mg/dL    Protein Negative Negative mg/dL    Bilirubin Negative Negative    Urobilinogen, Urine 1.0 <=1.0 EU/dL    Nitrite Negative Negative    Leukocyte Esterase Negative Negative    Occult Blood Negative Negative     Micro Urine Req see below        Radiology  [id number]   Final Result      No acute abnormality. Normal appendix.      US-RUQ   Final Result      No acute abnormality.      US-OB 1ST TRIMESTER SINGLE GEST Is the patient pregnant? Yes   Final Result      1.  Single living intrauterine pregnancy at 13 week and 4 day estimated gestational age.          Problem List  1.  Abdominal pain during pregnancy    Electronically signed by: Rex Still M.D., 1/1/2025 2:57 AM

## 2025-01-01 NOTE — ED TRIAGE NOTES
Jo Annrosemarykenisha Shields [place]  24 y.o. female  Chief Complaint   Patient presents with    Abdominal Pain     LRQ and mid upper abd pain with N/V x2 days. Pt is 13 weeks pregnant. Denies vaginal bleeding.        Pt ambulated to triage. Abd pain protocol ordered.     Pt is alert, oriented, and follows commands. Pt speaking in full sentences and responds appropriately to questions. No acute distress noted in triage. Respirations are even and unlabored.     Pt to phleb and educated on triage process. Pt encouraged to alert triage staff for any changes in condition. Pt signed up for messaging updates prior to leaving triage room.    Vitals:    12/31/24 2121   BP: (!) 135/95   Pulse: 86   Resp: 14   Temp: 35.9 °C (96.6 °F)   SpO2: 98%

## 2025-01-01 NOTE — ED PROVIDER NOTES
ER Provider Note    Scribed for  Israel Jensen D.O. by Fernando Spain. 2024   10:31 PM    Primary Care Provider: Liza Huerta M.D.    CHIEF COMPLAINT  Chief Complaint   Patient presents with    Abdominal Pain     LRQ and mid upper abd pain with N/V x2 days. Pt is 13 weeks pregnant. Denies vaginal bleeding.        EXTERNAL RECORDS REVIEWED  External ED Note The patient has been seen at the ER multiple times recently. She was seen on  for a URI. Confirmed intrauterine pregnancy     HPI/ROS  LIMITATION TO HISTORY   Select: : None  OUTSIDE HISTORIAN(S):  None    Jasvir Maddox is a 24 y.o. female 13 weeks pregnant a0 confirmed by ultrasound who presents to the ED for evaluation of abdominal pain onset yesterday. The patient reports lower right quadrant and upper middle abdominal pain with associated nausea and vomiting. She notes the episodes of emesis started two days ago. Patient reports this is her first pregnancy and the baby has a confirmed heart beat at 5 weeks. She denies vaginal bleeding, history of abdominal surgery    PAST MEDICAL HISTORY  Past Medical History:   Diagnosis Date    Asthma     Polycystic ovary syndrome        SURGICAL HISTORY  History reviewed. No pertinent surgical history.    FAMILY HISTORY  History reviewed. No pertinent family history.    SOCIAL HISTORY   reports that she has never smoked. She has never used smokeless tobacco. She reports that she does not currently use alcohol. She reports that she does not currently use drugs after having used the following drugs: Inhaled.    CURRENT MEDICATIONS  Previous Medications    DROSPIRENONE-ETHINYL ESTRADIOL (LORYNA) 3-0.02 MG PER TABLET    Take 1 Tablet by mouth every day. Indications: Polycystic Ovary Syndrome    MEDROXYPROGESTERONE (PROVERA) 10 MG TAB    Take 1 Tablet by mouth every day.    METFORMIN (GLUCOPHAGE) 500 MG TAB    Take 500 mg by mouth 2 times a day with meals.    PRENATAL VIT-FE FUMARATE-FA (PRENATAL  "VITAMINS) 28-0.8 MG TAB    Take 1 Tablet by mouth every day.       ALLERGIES  Allergies   Allergen Reactions    Augmentin [Amoxicillin-Pot Clavulanate] Hives    Iodine Contrast [Diagnostic X-Ray Materials] Vomiting    Sulfa Drugs Hives        PHYSICAL EXAM  BP (!) 135/95   Pulse 86   Temp 35.9 °C (96.6 °F) (Temporal)   Resp 14   Ht 1.651 m (5' 5\")   Wt 121 kg (266 lb 1.5 oz)   LMP 09/27/2024 (Exact Date) Comment: Positive pregnancy on pregnancy home kit  SpO2 98%   BMI 44.28 kg/m²    General: No acute distress  Neuro: Awake alert and oriented, muscle strength sensation normal  Neck: Supple  Cardiac: Regular rate and rhythm  Pulmonary: Clear to auscultation bilaterally no distress  Abdomen: Soft, nondistended, right upper quadrant tenderness  Back: Nontender  Psych: Normal  Skin: Pink warm dry  Extremities: Full range of motion, muscle strength sensation intact 2+ pulses    DIAGNOSTIC STUDIES/PROCEDURES  Labs:   Labs Reviewed   CBC WITH DIFFERENTIAL - Abnormal; Notable for the following components:       Result Value    WBC 12.1 (*)     Hemoglobin 11.7 (*)     Hematocrit 36.0 (*)     MCH 26.6 (*)     Neutrophils (Absolute) 8.51 (*)     All other components within normal limits   COMP METABOLIC PANEL - Abnormal; Notable for the following components:    Sodium 134 (*)     Glucose 105 (*)     Bun 7 (*)     AST(SGOT) 11 (*)     Globulin 3.7 (*)     All other components within normal limits   HCG QUANTITATIVE - Abnormal; Notable for the following components:    Bhcg 83653.0 (*)     All other components within normal limits   URINALYSIS,CULTURE IF INDICATED - Abnormal; Notable for the following components:    Ketones Trace (*)     All other components within normal limits   LIPASE   RH TYPE FOR RHOGAM FROM E.D.   ESTIMATED GFR     I have independently interpreted the above labs    Radiology:   This attending emergency physician has independently interpreted the diagnostic imaging associated with this visit and is " awaiting the final reading from the radiologist.   Preliminary interpretation is a follows: Negative right upper quadrant ultrasound, negative pathology seen on pelvic ultrasound.  Single live IUP.  Pending MRI  Radiologist interpretation:   US-RUQ   Final Result      No acute abnormality.      US-OB 1ST TRIMESTER SINGLE GEST Is the patient pregnant? Yes   Final Result      1.  Single living intrauterine pregnancy at 13 week and 4 day estimated gestational age.      [id number]    (Results Pending)        COURSE & MEDICAL DECISION MAKING     ED OBS: Yes; I am placing the patient in to an observation status due to a diagnostic uncertainty as well as therapeutic intensity. Patient placed in observation status at 21:21, 2024    Observation plan is as follows: MRI abdomen to rule out appendicitis and evaluate abdominal pain.  Reevaluation.  Disposition.    Upon Reevaluation, the patient's condition has: Pending MRI    Patient discharged from ED Observation status: To be determined    INITIAL ASSESSMENT, COURSE AND PLAN  Differential diagnoses include but not limited to: Cholecystitis, gastritis, pancreatitis, appendicitis    Care Narrative: 24 year old  13 weeks pregnant female, confirmed by ultrasound.  Presents with right upper and right lower quadrant abdominal pain a little bit of tenderness in the right upper and right lower quadrant, no rebound no guarding nondistended.  Patient denies any vaginal bleeding or fever or flank pain.    10:31 PM - Patient was first seen and evaluated at bedside. Patient presents to the ED for abdominal pain. Ordered for US-OB limited transabdominal, US-ruq, RH type for Rhogam from ED, UA, HCG quantitative, lipase, CMP, CBC w diff, to evaluate. Patient verbalizes understanding and support with my plan of care.     2025: Patient's laboratory evaluation is reassuring other than a minimal leukocytosis.  Urinalysis clean.  Lipase normal LFTs benign.  Ultrasound of  the right upper quadrant is benign.  Ultrasound of the pelvis is positive for IUP but no cause of abdominal pain.  1AM: Signed patient out to Dr. Still while the patient is in observation to follow-up on MRI and determine disposition.  MRI negative for appendicitis discharge and follow-up with primary care and OB/GYN.  Patient agrees with this plan.  Positive MRI findings will be managed per Dr. Still.    DISPOSITION AND DISCUSSIONS    I have discussed management of the patient with the following physicians and NILSON's: None    Discussion of management with other QHP or appropriate source(s): Discussed with radiology    Escalation of care considered, and ultimately not performed: Pending disposition    Barriers to care at this time, including but not limited to: Patient does not have established PCP.     Decision tools and prescription drugs considered including, but not limited to: Pending disposition      FINAL DIAGNOSIS  1. Abdominal pain during pregnancy, antepartum    2. Leukocytosis, unspecified type         I, Fernando Spain (Neva), am scribing for, and in the presence of, Israel Jensen D.O..    Electronically signed by: Fernando Spain (Neva), 12/31/2024    IIsrael D.O. personally performed the services described in this documentation, as scribed by Fernando Spain in my presence, and it is both accurate and complete.      The note accurately reflects work and decisions made by me.  Israel Jensen D.O.  1/1/2025  1:00 AM

## 2025-02-12 ENCOUNTER — HOSPITAL ENCOUNTER (EMERGENCY)
Facility: MEDICAL CENTER | Age: 25
End: 2025-02-13
Attending: OBSTETRICS & GYNECOLOGY | Admitting: OBSTETRICS & GYNECOLOGY
Payer: COMMERCIAL

## 2025-02-12 PROCEDURE — 302449 STATCHG TRIAGE ONLY (STATISTIC)

## 2025-02-12 RX ORDER — AZITHROMYCIN 250 MG/1
200 TABLET, FILM COATED ORAL ONCE
COMMUNITY

## 2025-02-12 ASSESSMENT — FIBROSIS 4 INDEX: FIB4 SCORE: 0.21

## 2025-02-13 VITALS
DIASTOLIC BLOOD PRESSURE: 56 MMHG | SYSTOLIC BLOOD PRESSURE: 106 MMHG | TEMPERATURE: 97.8 F | BODY MASS INDEX: 43.82 KG/M2 | WEIGHT: 263 LBS | HEIGHT: 65 IN | HEART RATE: 110 BPM | OXYGEN SATURATION: 96 %

## 2025-02-13 LAB
ALBUMIN SERPL BCP-MCNC: 3.3 G/DL (ref 3.2–4.9)
ALBUMIN/GLOB SERPL: 0.9 G/DL
ALP SERPL-CCNC: 86 U/L (ref 30–99)
ALT SERPL-CCNC: 51 U/L (ref 2–50)
ANION GAP SERPL CALC-SCNC: 10 MMOL/L (ref 7–16)
APPEARANCE UR: CLEAR
AST SERPL-CCNC: 25 U/L (ref 12–45)
BILIRUB SERPL-MCNC: <0.2 MG/DL (ref 0.1–1.5)
BILIRUB UR QL STRIP.AUTO: NEGATIVE
BUN SERPL-MCNC: 6 MG/DL (ref 8–22)
CALCIUM ALBUM COR SERPL-MCNC: 9.9 MG/DL (ref 8.5–10.5)
CALCIUM SERPL-MCNC: 9.3 MG/DL (ref 8.5–10.5)
CHLORIDE SERPL-SCNC: 104 MMOL/L (ref 96–112)
CO2 SERPL-SCNC: 20 MMOL/L (ref 20–33)
COLOR UR AUTO: YELLOW
CREAT SERPL-MCNC: 0.73 MG/DL (ref 0.5–1.4)
CREAT UR-MCNC: 130 MG/DL
ERYTHROCYTE [DISTWIDTH] IN BLOOD BY AUTOMATED COUNT: 43.4 FL (ref 35.9–50)
GFR SERPLBLD CREATININE-BSD FMLA CKD-EPI: 117 ML/MIN/1.73 M 2
GLOBULIN SER CALC-MCNC: 3.5 G/DL (ref 1.9–3.5)
GLUCOSE SERPL-MCNC: 110 MG/DL (ref 65–99)
GLUCOSE UR QL STRIP.AUTO: NEGATIVE MG/DL
HCT VFR BLD AUTO: 31.8 % (ref 37–47)
HGB BLD-MCNC: 10.1 G/DL (ref 12–16)
KETONES UR QL STRIP.AUTO: NEGATIVE MG/DL
LEUKOCYTE ESTERASE UR QL STRIP.AUTO: NEGATIVE
MCH RBC QN AUTO: 26.2 PG (ref 27–33)
MCHC RBC AUTO-ENTMCNC: 31.8 G/DL (ref 32.2–35.5)
MCV RBC AUTO: 82.6 FL (ref 81.4–97.8)
NITRITE UR QL STRIP.AUTO: NEGATIVE
PH UR STRIP.AUTO: 6 [PH] (ref 5–8)
PLATELET # BLD AUTO: 373 K/UL (ref 164–446)
PMV BLD AUTO: 9.6 FL (ref 9–12.9)
POTASSIUM SERPL-SCNC: 3.7 MMOL/L (ref 3.6–5.5)
PROT SERPL-MCNC: 6.8 G/DL (ref 6–8.2)
PROT UR QL STRIP: ABNORMAL MG/DL
PROT UR-MCNC: 11 MG/DL (ref 0–15)
PROT/CREAT UR: 85 MG/G (ref 10–107)
RBC # BLD AUTO: 3.85 M/UL (ref 4.2–5.4)
RBC UR QL AUTO: NEGATIVE
SODIUM SERPL-SCNC: 134 MMOL/L (ref 135–145)
SP GR UR STRIP.AUTO: 1.02 (ref 1–1.03)
UROBILINOGEN UR STRIP.AUTO-MCNC: 1 MG/DL
WBC # BLD AUTO: 13.3 K/UL (ref 4.8–10.8)

## 2025-02-13 PROCEDURE — 84156 ASSAY OF PROTEIN URINE: CPT

## 2025-02-13 PROCEDURE — 81002 URINALYSIS NONAUTO W/O SCOPE: CPT

## 2025-02-13 PROCEDURE — 80053 COMPREHEN METABOLIC PANEL: CPT

## 2025-02-13 PROCEDURE — 85027 COMPLETE CBC AUTOMATED: CPT

## 2025-02-13 PROCEDURE — 36415 COLL VENOUS BLD VENIPUNCTURE: CPT

## 2025-02-13 PROCEDURE — 82570 ASSAY OF URINE CREATININE: CPT

## 2025-02-13 NOTE — DISCHARGE INSTRUCTIONS
Pre-term Labor (<37 weeks):  Call your physician or return to the hospital if:  You have painless regular contractions more than 4 in one hour.  Your water breaks (remember time and color).  You have menstrual-like cramps, a low dull backache or pressure in your pelvis or back.  Your baby does not move enough to complete the daily kick count (10 movements in 2 hours).  Your baby moves much less often than on the days before or you have not felt your baby move all day.  Please review the MEDICATION LIST section of your AFTER VISIT SUMMARY document.  Take your medication as prescribed     nonverbal cues (demo/gestures)/1 person assist/verbal cues

## 2025-02-13 NOTE — PROGRESS NOTES
2331: Pt is a G 1 P 0 EDC 7/1 @ 20.1weeks. Presents to L&D triage for elevated Bps and nausea. Pt states she took her BP 3 days ago and systolic was in the 160s, states they have been elevated 160s systolic  since then and the nausea influenced her to come to be seen in triage today. Pt states + FM, -contractions, - vaginal bleeding, -LOF. Doppler obtained. VS cycling [id number]. Questions and concerns answered. Call light in reach.    0008: [facility]. Larry MD notified of pt status. Orders received.     0120: [facility]. Larry MD notified of lab results and Bps. Orders to discharge.    0141: Pt educated. Ambulated off unit in stable condition.

## 2025-02-20 ENCOUNTER — HOSPITAL ENCOUNTER (OUTPATIENT)
Facility: MEDICAL CENTER | Age: 25
End: 2025-02-21
Attending: STUDENT IN AN ORGANIZED HEALTH CARE EDUCATION/TRAINING PROGRAM | Admitting: OBSTETRICS & GYNECOLOGY
Payer: COMMERCIAL

## 2025-02-20 DIAGNOSIS — O13.2 GESTATIONAL HYPERTENSION, SECOND TRIMESTER: ICD-10-CM

## 2025-02-20 DIAGNOSIS — R00.2 PALPITATIONS: ICD-10-CM

## 2025-02-20 DIAGNOSIS — R06.02 SHORTNESS OF BREATH: ICD-10-CM

## 2025-02-20 PROCEDURE — 93005 ELECTROCARDIOGRAM TRACING: CPT | Mod: TC

## 2025-02-20 PROCEDURE — 99285 EMERGENCY DEPT VISIT HI MDM: CPT

## 2025-02-20 PROCEDURE — 93005 ELECTROCARDIOGRAM TRACING: CPT | Mod: TC | Performed by: STUDENT IN AN ORGANIZED HEALTH CARE EDUCATION/TRAINING PROGRAM

## 2025-02-20 ASSESSMENT — FIBROSIS 4 INDEX: FIB4 SCORE: 0.23

## 2025-02-20 ASSESSMENT — PAIN DESCRIPTION - PAIN TYPE: TYPE: ACUTE PAIN

## 2025-02-21 VITALS
TEMPERATURE: 96.6 F | HEIGHT: 65 IN | OXYGEN SATURATION: 100 % | RESPIRATION RATE: 20 BRPM | WEIGHT: 260.58 LBS | HEART RATE: 91 BPM | BODY MASS INDEX: 43.42 KG/M2 | DIASTOLIC BLOOD PRESSURE: 69 MMHG | SYSTOLIC BLOOD PRESSURE: 124 MMHG

## 2025-02-21 LAB
ALBUMIN SERPL BCP-MCNC: 3.7 G/DL (ref 3.2–4.9)
ALBUMIN/GLOB SERPL: 1 G/DL
ALP SERPL-CCNC: 90 U/L (ref 30–99)
ALT SERPL-CCNC: 53 U/L (ref 2–50)
ANION GAP SERPL CALC-SCNC: 13 MMOL/L (ref 7–16)
APPEARANCE UR: ABNORMAL
AST SERPL-CCNC: 30 U/L (ref 12–45)
BACTERIA #/AREA URNS HPF: ABNORMAL /HPF
BASOPHILS # BLD AUTO: 0.3 % (ref 0–1.8)
BASOPHILS # BLD: 0.04 K/UL (ref 0–0.12)
BILIRUB SERPL-MCNC: <0.2 MG/DL (ref 0.1–1.5)
BILIRUB UR QL STRIP.AUTO: NEGATIVE
BUN SERPL-MCNC: 9 MG/DL (ref 8–22)
CALCIUM ALBUM COR SERPL-MCNC: 9.6 MG/DL (ref 8.5–10.5)
CALCIUM SERPL-MCNC: 9.4 MG/DL (ref 8.5–10.5)
CASTS URNS QL MICRO: ABNORMAL /LPF (ref 0–2)
CHLORIDE SERPL-SCNC: 102 MMOL/L (ref 96–112)
CO2 SERPL-SCNC: 20 MMOL/L (ref 20–33)
COLOR UR: YELLOW
CREAT SERPL-MCNC: 0.73 MG/DL (ref 0.5–1.4)
CREAT UR-MCNC: 170 MG/DL
D DIMER PPP IA.FEU-MCNC: 0.33 UG/ML (FEU) (ref 0–0.5)
DEPRECATED # ENTITIES SPRM: PRESENT /HPF
EKG IMPRESSION: NORMAL
EOSINOPHIL # BLD AUTO: 0.1 K/UL (ref 0–0.51)
EOSINOPHIL NFR BLD: 0.7 % (ref 0–6.9)
EPITHELIAL CELLS 1715: >20 /HPF (ref 0–5)
ERYTHROCYTE [DISTWIDTH] IN BLOOD BY AUTOMATED COUNT: 45 FL (ref 35.9–50)
GFR SERPLBLD CREATININE-BSD FMLA CKD-EPI: 117 ML/MIN/1.73 M 2
GLOBULIN SER CALC-MCNC: 3.7 G/DL (ref 1.9–3.5)
GLUCOSE SERPL-MCNC: 86 MG/DL (ref 65–99)
GLUCOSE UR STRIP.AUTO-MCNC: NEGATIVE MG/DL
HCT VFR BLD AUTO: 36.9 % (ref 37–47)
HGB BLD-MCNC: 11.6 G/DL (ref 12–16)
IMM GRANULOCYTES # BLD AUTO: 0.1 K/UL (ref 0–0.11)
IMM GRANULOCYTES NFR BLD AUTO: 0.7 % (ref 0–0.9)
KETONES UR STRIP.AUTO-MCNC: ABNORMAL MG/DL
LEUKOCYTE ESTERASE UR QL STRIP.AUTO: NEGATIVE
LYMPHOCYTES # BLD AUTO: 2.93 K/UL (ref 1–4.8)
LYMPHOCYTES NFR BLD: 21.2 % (ref 22–41)
MCH RBC QN AUTO: 26.2 PG (ref 27–33)
MCHC RBC AUTO-ENTMCNC: 31.4 G/DL (ref 32.2–35.5)
MCV RBC AUTO: 83.3 FL (ref 81.4–97.8)
MICRO URNS: ABNORMAL
MONOCYTES # BLD AUTO: 0.76 K/UL (ref 0–0.85)
MONOCYTES NFR BLD AUTO: 5.5 % (ref 0–13.4)
NEUTROPHILS # BLD AUTO: 9.91 K/UL (ref 1.82–7.42)
NEUTROPHILS NFR BLD: 71.6 % (ref 44–72)
NITRITE UR QL STRIP.AUTO: NEGATIVE
NRBC # BLD AUTO: 0 K/UL
NRBC BLD-RTO: 0 /100 WBC (ref 0–0.2)
NT-PROBNP SERPL IA-MCNC: <36 PG/ML (ref 0–125)
PH UR STRIP.AUTO: 5.5 [PH] (ref 5–8)
PLATELET # BLD AUTO: 400 K/UL (ref 164–446)
PMV BLD AUTO: 9.4 FL (ref 9–12.9)
POTASSIUM SERPL-SCNC: 3.7 MMOL/L (ref 3.6–5.5)
PROT SERPL-MCNC: 7.4 G/DL (ref 6–8.2)
PROT UR QL STRIP: NEGATIVE MG/DL
PROT UR-MCNC: 14.9 MG/DL (ref 0–15)
PROT/CREAT UR: 88 MG/G (ref 10–107)
RBC # BLD AUTO: 4.43 M/UL (ref 4.2–5.4)
RBC # URNS HPF: ABNORMAL /HPF (ref 0–2)
RBC UR QL AUTO: NEGATIVE
SODIUM SERPL-SCNC: 135 MMOL/L (ref 135–145)
SP GR UR STRIP.AUTO: 1.02
TROPONIN T SERPL-MCNC: <6 NG/L (ref 6–19)
UROBILINOGEN UR STRIP.AUTO-MCNC: 0.2 EU/DL
WBC # BLD AUTO: 13.8 K/UL (ref 4.8–10.8)
WBC #/AREA URNS HPF: ABNORMAL /HPF

## 2025-02-21 PROCEDURE — 84484 ASSAY OF TROPONIN QUANT: CPT

## 2025-02-21 PROCEDURE — 82570 ASSAY OF URINE CREATININE: CPT

## 2025-02-21 PROCEDURE — 36415 COLL VENOUS BLD VENIPUNCTURE: CPT

## 2025-02-21 PROCEDURE — 83880 ASSAY OF NATRIURETIC PEPTIDE: CPT

## 2025-02-21 PROCEDURE — 85025 COMPLETE CBC W/AUTO DIFF WBC: CPT

## 2025-02-21 PROCEDURE — 84156 ASSAY OF PROTEIN URINE: CPT

## 2025-02-21 PROCEDURE — 96374 THER/PROPH/DIAG INJ IV PUSH: CPT

## 2025-02-21 PROCEDURE — 96375 TX/PRO/DX INJ NEW DRUG ADDON: CPT

## 2025-02-21 PROCEDURE — 81001 URINALYSIS AUTO W/SCOPE: CPT

## 2025-02-21 PROCEDURE — 80053 COMPREHEN METABOLIC PANEL: CPT

## 2025-02-21 PROCEDURE — 700111 HCHG RX REV CODE 636 W/ 250 OVERRIDE (IP): Mod: JZ,UD | Performed by: STUDENT IN AN ORGANIZED HEALTH CARE EDUCATION/TRAINING PROGRAM

## 2025-02-21 PROCEDURE — 85379 FIBRIN DEGRADATION QUANT: CPT

## 2025-02-21 RX ORDER — ONDANSETRON 2 MG/ML
4 INJECTION INTRAMUSCULAR; INTRAVENOUS ONCE
Status: COMPLETED | OUTPATIENT
Start: 2025-02-21 | End: 2025-02-21

## 2025-02-21 RX ORDER — CEFTRIAXONE 1 G/1
1000 INJECTION, POWDER, FOR SOLUTION INTRAMUSCULAR; INTRAVENOUS ONCE
Status: COMPLETED | OUTPATIENT
Start: 2025-02-21 | End: 2025-02-21

## 2025-02-21 RX ORDER — CEFTRIAXONE 500 MG/1
500 INJECTION, POWDER, FOR SOLUTION INTRAMUSCULAR; INTRAVENOUS ONCE
Status: DISCONTINUED | OUTPATIENT
Start: 2025-02-21 | End: 2025-02-21 | Stop reason: CLARIF

## 2025-02-21 RX ADMIN — ONDANSETRON 4 MG: 2 INJECTION INTRAMUSCULAR; INTRAVENOUS at 01:55

## 2025-02-21 RX ADMIN — CEFTRIAXONE SODIUM 1000 MG: 1 INJECTION, POWDER, FOR SOLUTION INTRAMUSCULAR; INTRAVENOUS at 01:56

## 2025-02-21 ASSESSMENT — PAIN SCALES - GENERAL: PAINLEVEL: 0 - NO PAIN

## 2025-02-21 NOTE — ED NOTES
Patient and all belongings transferred to L&D by transport staff accompanied by significant other.

## 2025-02-21 NOTE — ED NOTES
Patient medicated per MAR and started to vomit from medication. ERP contacted and zofran ordered and administered.

## 2025-02-21 NOTE — ED PROVIDER NOTES
"ED Provider Note    CHIEF COMPLAINT  Chief Complaint   Patient presents with    Palpitations     \"Racing heart rate\" x 7 hrs    Shortness of Breath     X 7 hrs, worse upon movement    Pregnancy     Pt states she is 21 weeks pregnant and has not felt the baby move all day,        EXTERNAL RECORDS REVIEWED  Outpatient Notes office visit on 2025 for hypertension, 20-week gestation    HPI/ROS  LIMITATION TO HISTORY   Select: : None  OUTSIDE HISTORIAN(S):    Jasvir Maddox is a 24 y.o. female who presents with 7 hours of palpitations, 7 hours of shortness of breath and dyspnea on exertion, mild dizziness.  Patient denies runny nose, sore throat, cough, fevers, chills, body aches, sweats.  Patient denies lower extremity swelling.  Patient reports recent diagnosis of high blood pressure while pregnant.  Patient was prescribed aspirin but has not taken any medications.    PAST MEDICAL HISTORY   has a past medical history of Anemia, Asthma, Chlamydia, and Polycystic ovary syndrome.    SURGICAL HISTORY  patient denies any surgical history    FAMILY HISTORY  No family history on file.    SOCIAL HISTORY  Social History     Tobacco Use    Smoking status: Never    Smokeless tobacco: Never   Vaping Use    Vaping status: Never Used   Substance and Sexual Activity    Alcohol use: Not Currently     Comment: rare    Drug use: Not Currently     Types: Inhaled     Comment: marijuana- pt states she last used 2024    Sexual activity: Not on file       CURRENT MEDICATIONS  Home Medications    **Home medications have not yet been reviewed for this encounter**         ALLERGIES  Allergies   Allergen Reactions    Augmentin [Amoxicillin-Pot Clavulanate] Hives    Iodine Contrast [Diagnostic X-Ray Materials] Vomiting    Sulfa Drugs Hives       PHYSICAL EXAM  VITAL SIGNS: /62   Pulse (!) 107   Temp 35.9 °C (96.6 °F) (Temporal)   Resp 20   Ht 1.651 m (5' 5\")   Wt 118 kg (260 lb 9.3 oz)   LMP 2024 " (Exact Date) Comment: Positive pregnancy on pregnancy home kit  SpO2 98%   BMI 43.36 kg/m²    Vitals and nursing note reviewed.   Constitutional:       Comments: Patient is lying in bed supine, pleasant, conversant, speaking in complete sentences   HENT:      Head: Normocephalic and atraumatic.   Eyes:      Extraocular Movements: Extraocular movements intact.      Conjunctiva/sclera: Conjunctivae normal.      Pupils: Pupils are equal, round, and reactive to light.   Cardiovascular:      Pulses: Normal pulses.      Comments: HR 92  Pulmonary:      Effort: Pulmonary effort is normal. No respiratory distress.   Clear to auscultation bilaterally  Abdominal:      Comments: Abdomen is soft, non-tender, non-distended, non-rigid, no rebound, guarding, masses, no McBurney's point tenderness, no peritoneal signs, negative Rovsing sign, negative Elder sign.  No CVA tenderness to palpation. Benign abdomen.   Point-of-care OB ultrasound demonstrates good fine and gross motor movement of the fetus, fetal heart rate 136  Musculoskeletal:         General: No lower extremity swelling. Normal range of motion.      Cervical back: Normal range of motion. No rigidity.   Skin:     General: Skin is warm and dry.      Capillary Refill: Capillary refill takes less than 2 seconds.   Neurological:      Mental Status: Alert.     COURSE & MEDICAL DECISION MAKING    ASSESSMENT, COURSE AND PLAN  Care Narrative: CBC to evaluate for acute anemia and leukocytosis.  CMP to evaluate for acute electrolyte abnormality, acute kidney injury, acute liver failure or dysfunction.  D-dimer to rule out PE.  BNP to evaluate for heart failure.  Troponin to evaluate for ACS, EKG without ischemia.  Will defer chest x-ray at this time due to pregnancy.  L&D nurse at the bedside evaluating with Doppler.  Patient reports no fetal movement for the past 24 hours.    Electronically signed by: Mateo Ramesh M.D., 2/21/2025 12:46 AM    D-dimer negative, PE  inconsistent with patient presentation at this time.  BNP within normal limits, no inspiratory rales or lower extremity edema, acute cardiomyopathy inconsistent with patient presentation at this time.  Troponin negative, EKG nonischemic, acute coronary syndrome inconsistent with patient presentation at this time.  No inspiratory rales or wheezing on lung exam, pneumonia less likely, afebrile.  Patient has mild leukocytosis, this could be secondary to an acute viral process.  Urinalysis without evidence of UTI, no leukocyte esterase or nitrites.  Patient given Rocephin for recent chlamydial infection that has only been treated for azithromycin.  Prescription has been written for the patient's partner as well.  Patient will be sent to L&D for further monitoring due to hypertension.  Patient's fetal ultrasound at the bedside within normal limits.  Urinalysis without evidence of proteinuria.    This dictation has been created using voice recognition software. I am continuously working with the software to minimize the number of voice recognition errors and I have made every attempt to manually correct the errors within my dictation. However errors  related to this voice recognition software may still exist and should be interpreted within the appropriate context.     Electronically signed by: Mateo Ramesh M.D., 2/21/2025 2:45 AM      HEART SCORE:  History - 0  EKG - 0  Age - 0  Risk Factors - 1  Initial Troponin - 0  Total - 1        FINAL DIAGNOSIS  1. Palpitations    2. Shortness of breath    3. Gestational hypertension, second trimester         Electronically signed by: Mateo Ramesh M.D., 2/21/2025 12:43 AM

## 2025-02-21 NOTE — DISCHARGE INSTRUCTIONS
Pre-term Labor (<37 weeks):  Call your physician or return to the hospital if:  You have painless regular contractions more than 4 in one hour.  Your water breaks (remember time and color).  You have menstrual-like cramps, a low dull backache or pressure in your pelvis or back.  Your baby does not move enough to complete the daily kick count starting at 28 weeks (10 movements in 2 hours).  Your baby moves much less often than on the days before or you have not felt your baby move all day.  Please review the MEDICATION LIST section of your AFTER VISIT SUMMARY document.  Take your medication as prescribed

## 2025-02-21 NOTE — ED PROVIDER NOTES
Department of Obstetrics and Gynecology  Labor and Delivery History and Physical  OB ED Note    Date of Admission: 2025     ID: 24 y.o.  with IUP at 21 weeks and 3 days. JANETTE 2025    Primary OB: Corinne E Capurro, M.D.     Attending OB: Hannah Shipley M.D.     CC: Shortness of breath and palpitations    HPI: Jasvir Maddox is a 24 y.o.  at 2025.  She was originally evaluated in the emergency room for shortness of breath and palpitations.  She was discharged from the ED. please see the note from the emergency room regarding her evaluation there.  She was advised to come to labor and delivery because she is pregnant.     She is no longer feeling short of breath or having palpitations.  She still does not feel the baby move on a regular basis.    She has a known diagnosis of chronic hypertension and is not on any medications.  She is followed by Dr. Baldo mcbride and the [facility].  She was advised to start aspirin but has not done so.    Both she and her  were treated within the last week for chlamydia.  She tested negative for gonorrhea.     ROS: 10 systems reviewed and negative except as noted above.    Obstetric History    -current pregnancy        Past Medical History  Surgical History   Chronic hypertension without medication use  Obesity  PCOS   None      Gynecologic History  Social History   Irregular menses prior to pregnancy  Denies Hx of abnormal pap smears.  Positive Hx of STIs  Both she and her partner were treated this week for chlamydia with oral azithromycin.  Too early for test of cure Tobacco: Denies  EtOH: Denies  Street Drugs: Denies      Medications  Allergies   No current facility-administered medications on file prior to encounter.     Current Outpatient Medications on File Prior to Encounter   Medication Sig Dispense Refill    azithromycin (ZITHROMAX) 250 MG Tab Take 200 mg by mouth one time. Indications: Tx for chlmaydia      Prenatal  "Vit-Fe Fumarate-FA (PRENATAL VITAMINS) 28-0.8 MG Tab Take 1 Tablet by mouth every day. 30 Tablet 0    Augmentin [amoxicillin-pot clavulanate], Iodine contrast [diagnostic x-ray materials], and Sulfa drugs       O: /62   Pulse (!) 107   Temp 35.9 °C (96.6 °F) (Temporal)   Resp 20   Ht 1.651 m (5' 5\")   Wt 118 kg (260 lb 9.3 oz)   LMP 09/27/2024 (Exact Date) Comment: Positive pregnancy on pregnancy home kit  SpO2 98%   BMI 43.36 kg/m²     Gen: NAD, AAO  Abd: Gravid, NTTP,Cephalic by Leopolds, No rebound or guarding  Ext: NTTP, no edema, 2+DPP  Pelvic: SVE deferred    FHT: Present    Labs:   Admission on 02/12/2025, Discharged on 02/13/2025   Component Date Value Ref Range Status    Total Protein, Urine 02/13/2025 11.0  0.0 - 15.0 mg/dL Final    Creatinine, Random Urine 02/13/2025 130.00  mg/dL Final    Comment: Reference ranges have not been established for this specimen type.  Result interpretation should include consideration of patient's  medical condition and clinical presentation.      Protein Creatinine Ratio 02/13/2025 85  10 - 107 mg/g Final    WBC 02/13/2025 13.3 (H)  4.8 - 10.8 K/uL Final    RBC 02/13/2025 3.85 (L)  4.20 - 5.40 M/uL Final    Hemoglobin 02/13/2025 10.1 (L)  12.0 - 16.0 g/dL Final    Hematocrit 02/13/2025 31.8 (L)  37.0 - 47.0 % Final    MCV 02/13/2025 82.6  81.4 - 97.8 fL Final    MCH 02/13/2025 26.2 (L)  27.0 - 33.0 pg Final    MCHC 02/13/2025 31.8 (L)  32.2 - 35.5 g/dL Final    RDW 02/13/2025 43.4  35.9 - 50.0 fL Final    Platelet Count 02/13/2025 373  164 - 446 K/uL Final    MPV 02/13/2025 9.6  9.0 - 12.9 fL Final    Sodium 02/13/2025 134 (L)  135 - 145 mmol/L Final    Potassium 02/13/2025 3.7  3.6 - 5.5 mmol/L Final    Chloride 02/13/2025 104  96 - 112 mmol/L Final    Co2 02/13/2025 20  20 - 33 mmol/L Final    Anion Gap 02/13/2025 10.0  7.0 - 16.0 Final    Glucose 02/13/2025 110 (H)  65 - 99 mg/dL Final    Bun 02/13/2025 6 (L)  8 - 22 mg/dL Final    Creatinine 02/13/2025 " 0.73  0.50 - 1.40 mg/dL Final    Calcium 2025 9.3  8.5 - 10.5 mg/dL Final    Correct Calcium 2025 9.9  8.5 - 10.5 mg/dL Final    AST(SGOT) 2025 25  12 - 45 U/L Final    ALT(SGPT) 2025 51 (H)  2 - 50 U/L Final    Alkaline Phosphatase 2025 86  30 - 99 U/L Final    Total Bilirubin 2025 <0.2  0.1 - 1.5 mg/dL Final    Albumin 2025 3.3  3.2 - 4.9 g/dL Final    Total Protein 2025 6.8  6.0 - 8.2 g/dL Final    Globulin 2025 3.5  1.9 - 3.5 g/dL Final    A-G Ratio 2025 0.9  g/dL Final    POC Color 2025 Yellow   Final    POC Appearance 2025 Clear   Final    POC Glucose 2025 Negative  Negative mg/dL Final    POC Ketones 2025 Negative  Negative mg/dL Final    POC Specific Gravity 2025 1.025  1.005 - 1.030 Final    POC Blood 2025 Negative  Negative Final    POC Urine PH 2025 6.0  5.0 - 8.0 Final    POC Protein 2025 Trace (A)  Negative mg/dL Final    Bilirubin 2025 Negative  Negative Final    Urobilinogen, Urine 2025 1.0  Negative Final    POC Nitrites 2025 Negative  Negative Final    POC Leukocyte Esterase 2025 Negative  Negative Final    GFR (CKD-EPI) 2025 117  >60 mL/min/1.73 m 2 Final    Comment: Estimated Glomerular Filtration Rate is calculated using  race neutral CKD-EPI  equation per NKF-ASN recommendations.           A/P: Jasvir Maddox is a 24 y.o.  at 21 and 3 here for shortness of breath and palpitations    Shortness of breath and palpitations: Please see separate exam note from emergency room physician regarding evaluation of this she was determined fit to discharge regarding these concerns.   2.  Chronic hypertension: Review of emergency room visits indicate that she has        chronic hypertension that has not required blood pressure medication.  She had 2        elevated blood pressures in the emergency room though these occurred in the        context of  emesis.  She has no current signs or symptoms of preeclampsia.  PC        ratio will be sent for baseline.  She does not currently need medication management of her blood pressure  3.  Chlamydia: Both she and her partner have been treated for this.  Of note she   tested negative for gonorrhea.  She was presumably treated for gonorrhea today.  I would recommend her partner go have testing himself.  Advised abstinence for 1 week.  She will need a test of cure in 4 to 6 weeks  4.  Fetal heart tones present and reassuring for gestational age. Fetal movement at current gestational age discussed    Hannah Shipley M.D.,  2/21/2025, 2:47 AM

## 2025-02-21 NOTE — ED TRIAGE NOTES
"Chief Complaint   Patient presents with    Palpitations     \"Racing heart rate\" x 7 hrs    Shortness of Breath     X 7 hrs, worse upon movement    Pregnancy     Pt states she is 21 weeks pregnant and has not felt the baby move all day,      Pt ambulatory to triage for above complaints, A+O x 4, GCS 15, answering questions appropriately    ER charge and L&D charge notified of pt's chief complaints  "

## 2025-02-21 NOTE — ED NOTES
Patient ambulated from lobby to room independently with steady gait accompanied by significant other.  Urine sample collected in triage.    Chart up for ERP.    No

## 2025-02-21 NOTE — PROGRESS NOTES
0220: Patient presenting with ccomplaints of elevated BP and HR. Patient denies, SOB, VB, contractions/cramping, HA, vision changes. Patient states she has not felt baby moving as much today. Heart tones dopplered in 150s. VS taken.     0230: Report given to Dr. Shipley. Report given to Enedina ALCANTARA.     0300: Report received from Enedina ALCANTARA.     0310: Dr. Shipley at bedside.    0340: Reviewed D/C paperwork with patient and support person discussed pregnancy in hypertension,  labor and reasons to return to the hospital. Patient verbalized understanding, all questions answered at this time. D/C paperwork signed by patient. Patient donned personal clothing.     0351: Patient ambulated off unit with significant other and all personal belongings, relinquished care of patient at this time.

## 2025-03-17 ENCOUNTER — HOSPITAL ENCOUNTER (EMERGENCY)
Facility: MEDICAL CENTER | Age: 25
End: 2025-03-17
Attending: OBSTETRICS & GYNECOLOGY | Admitting: OBSTETRICS & GYNECOLOGY
Payer: OTHER MISCELLANEOUS

## 2025-03-17 ENCOUNTER — HOSPITAL ENCOUNTER (EMERGENCY)
Facility: MEDICAL CENTER | Age: 25
End: 2025-03-17
Attending: STUDENT IN AN ORGANIZED HEALTH CARE EDUCATION/TRAINING PROGRAM
Payer: OTHER MISCELLANEOUS

## 2025-03-17 VITALS
WEIGHT: 243.17 LBS | BODY MASS INDEX: 40.51 KG/M2 | TEMPERATURE: 98.7 F | HEART RATE: 100 BPM | SYSTOLIC BLOOD PRESSURE: 117 MMHG | OXYGEN SATURATION: 98 % | DIASTOLIC BLOOD PRESSURE: 66 MMHG | RESPIRATION RATE: 17 BRPM | HEIGHT: 65 IN

## 2025-03-17 VITALS
DIASTOLIC BLOOD PRESSURE: 72 MMHG | TEMPERATURE: 97.2 F | WEIGHT: 260 LBS | BODY MASS INDEX: 43.27 KG/M2 | HEART RATE: 100 BPM | SYSTOLIC BLOOD PRESSURE: 128 MMHG | RESPIRATION RATE: 18 BRPM | OXYGEN SATURATION: 96 %

## 2025-03-17 DIAGNOSIS — S16.1XXA STRAIN OF NECK MUSCLE, INITIAL ENCOUNTER: ICD-10-CM
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
## 2025-03-17 DIAGNOSIS — S06.0X0A CONCUSSION WITHOUT LOSS OF CONSCIOUSNESS, INITIAL ENCOUNTER: ICD-10-CM

## 2025-03-17 PROCEDURE — 99284 EMERGENCY DEPT VISIT MOD MDM: CPT

## 2025-03-17 PROCEDURE — 99283 EMERGENCY DEPT VISIT LOW MDM: CPT

## 2025-03-17 PROCEDURE — A9270 NON-COVERED ITEM OR SERVICE: HCPCS | Performed by: STUDENT IN AN ORGANIZED HEALTH CARE EDUCATION/TRAINING PROGRAM

## 2025-03-17 PROCEDURE — 700102 HCHG RX REV CODE 250 W/ 637 OVERRIDE(OP): Performed by: STUDENT IN AN ORGANIZED HEALTH CARE EDUCATION/TRAINING PROGRAM

## 2025-03-17 RX ORDER — ACETAMINOPHEN 325 MG/1
650 TABLET ORAL ONCE
Status: COMPLETED | OUTPATIENT
Start: 2025-03-17 | End: 2025-03-17

## 2025-03-17 RX ADMIN — ACETAMINOPHEN 650 MG: 325 TABLET ORAL at 02:37

## 2025-03-17 ASSESSMENT — FIBROSIS 4 INDEX
FIB4 SCORE: 0.25
FIB4 SCORE: 0.25

## 2025-03-17 ASSESSMENT — PAIN DESCRIPTION - PAIN TYPE: TYPE: ACUTE PAIN
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
## 2025-03-17 NOTE — ED NOTES
Patient medicated per MAR. Patient ready for discharge. Instructions given to patient. Patient educated on when/if to return to ED and follow-up appts. With PCP. Patient verbalized understanding.
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
## 2025-03-17 NOTE — ED NOTES
PT taken back to room via WC. PT able to transfer to [facility] independently. PT placed on monitor, call light within reach, and chart up for ERP.

## 2025-04-08 ENCOUNTER — HOSPITAL ENCOUNTER (EMERGENCY)
Facility: MEDICAL CENTER | Age: 25
End: 2025-04-08
Attending: OBSTETRICS & GYNECOLOGY | Admitting: OBSTETRICS & GYNECOLOGY
Payer: COMMERCIAL

## 2025-04-08 VITALS
HEART RATE: 102 BPM | TEMPERATURE: 97.3 F | SYSTOLIC BLOOD PRESSURE: 126 MMHG | RESPIRATION RATE: 18 BRPM | BODY MASS INDEX: 44.15 KG/M2 | DIASTOLIC BLOOD PRESSURE: 81 MMHG | HEIGHT: 65 IN | WEIGHT: 265 LBS

## 2025-04-08 LAB
ALBUMIN SERPL BCP-MCNC: 3.6 G/DL (ref 3.2–4.9)
ALBUMIN/GLOB SERPL: 1 G/DL
ALP SERPL-CCNC: 109 U/L (ref 30–99)
ALT SERPL-CCNC: 12 U/L (ref 2–50)
ANION GAP SERPL CALC-SCNC: 8 MMOL/L (ref 7–16)
AST SERPL-CCNC: 18 U/L (ref 12–45)
BASOPHILS # BLD AUTO: 0.3 % (ref 0–1.8)
BASOPHILS # BLD: 0.03 K/UL (ref 0–0.12)
BILIRUB SERPL-MCNC: 0.2 MG/DL (ref 0.1–1.5)
BUN SERPL-MCNC: 5 MG/DL (ref 8–22)
CALCIUM ALBUM COR SERPL-MCNC: 9.3 MG/DL (ref 8.5–10.5)
CALCIUM SERPL-MCNC: 9 MG/DL (ref 8.5–10.5)
CHLORIDE SERPL-SCNC: 106 MMOL/L (ref 96–112)
CO2 SERPL-SCNC: 22 MMOL/L (ref 20–33)
CREAT SERPL-MCNC: 0.59 MG/DL (ref 0.5–1.4)
CREAT UR-MCNC: 212 MG/DL
EOSINOPHIL # BLD AUTO: 0.05 K/UL (ref 0–0.51)
EOSINOPHIL NFR BLD: 0.5 % (ref 0–6.9)
ERYTHROCYTE [DISTWIDTH] IN BLOOD BY AUTOMATED COUNT: 45.8 FL (ref 35.9–50)
GFR SERPLBLD CREATININE-BSD FMLA CKD-EPI: 128 ML/MIN/1.73 M 2
GLOBULIN SER CALC-MCNC: 3.7 G/DL (ref 1.9–3.5)
GLUCOSE SERPL-MCNC: 93 MG/DL (ref 65–99)
HCT VFR BLD AUTO: 33.7 % (ref 37–47)
HGB BLD-MCNC: 10.6 G/DL (ref 12–16)
IMM GRANULOCYTES # BLD AUTO: 0.06 K/UL (ref 0–0.11)
IMM GRANULOCYTES NFR BLD AUTO: 0.6 % (ref 0–0.9)
LYMPHOCYTES # BLD AUTO: 2.12 K/UL (ref 1–4.8)
LYMPHOCYTES NFR BLD: 21.9 % (ref 22–41)
MCH RBC QN AUTO: 25.6 PG (ref 27–33)
MCHC RBC AUTO-ENTMCNC: 31.5 G/DL (ref 32.2–35.5)
MCV RBC AUTO: 81.4 FL (ref 81.4–97.8)
MONOCYTES # BLD AUTO: 0.69 K/UL (ref 0–0.85)
MONOCYTES NFR BLD AUTO: 7.1 % (ref 0–13.4)
NEUTROPHILS # BLD AUTO: 6.74 K/UL (ref 1.82–7.42)
NEUTROPHILS NFR BLD: 69.6 % (ref 44–72)
NRBC # BLD AUTO: 0 K/UL
NRBC BLD-RTO: 0 /100 WBC (ref 0–0.2)
PLATELET # BLD AUTO: 388 K/UL (ref 164–446)
PMV BLD AUTO: 10 FL (ref 9–12.9)
POTASSIUM SERPL-SCNC: 4.1 MMOL/L (ref 3.6–5.5)
PROT SERPL-MCNC: 7.3 G/DL (ref 6–8.2)
PROT UR-MCNC: 19.2 MG/DL (ref 0–15)
PROT/CREAT UR: 91 MG/G (ref 10–107)
RBC # BLD AUTO: 4.14 M/UL (ref 4.2–5.4)
SODIUM SERPL-SCNC: 136 MMOL/L (ref 135–145)
WBC # BLD AUTO: 9.7 K/UL (ref 4.8–10.8)

## 2025-04-08 PROCEDURE — 36415 COLL VENOUS BLD VENIPUNCTURE: CPT

## 2025-04-08 PROCEDURE — 80053 COMPREHEN METABOLIC PANEL: CPT

## 2025-04-08 PROCEDURE — 85025 COMPLETE CBC W/AUTO DIFF WBC: CPT

## 2025-04-08 PROCEDURE — 82570 ASSAY OF URINE CREATININE: CPT

## 2025-04-08 PROCEDURE — 84156 ASSAY OF PROTEIN URINE: CPT

## 2025-04-08 PROCEDURE — 59025 FETAL NON-STRESS TEST: CPT

## 2025-04-08 PROCEDURE — 302449 STATCHG TRIAGE ONLY (STATISTIC)

## 2025-04-08 ASSESSMENT — FIBROSIS 4 INDEX: FIB4 SCORE: 0.25

## 2025-04-08 NOTE — PROGRESS NOTES
24 y.o.    edc=  28 weeks    TOCO and US on.  VSS.  Pt presents to triage after being seen at the [facility] for follow up due to an elevated BP on the evening of  taken by the pt.  Her BP was normal in at the [facility] and is WNL here.  Dr. Oliver called and orders were received for PIH work up.  1715-Pt care assumed by Constanza ALCANTARA.

## 2025-04-09 NOTE — PROGRESS NOTES
1715 Report received from QUINTON Hurd. Care assumed at this time. Patient stating she needs to leave at 1730 due to having to  her significant other at work. Dr. Oliver notified of patient's time constraint.     1740 Dr. Oliver notified by QUINTON Kaur of patient's desire to leave. Dr. Oliver advising patient to stay until lab work has resulted and if patient would like to leave, she would need to sign AMA forms.     1743 Patient signed AMA forms. Patient educated to return to L&D if BP's taken at home continue to be elevated or if patient begins to experience vision changes, HA, right upper gastric pain or new onset of swelling.  labor precautions discussed. Patient ambulated off floor at this time.     1815 Dr. Oliver signed AMA paperwork and reviewed lab work.

## 2025-04-09 NOTE — NST NOTE
NST Review     Gestational Age: 28w0d     Indication: Elevated blood pressure     Baseline: 130's  Variability: Moderate  Accelerations: Present  Decelerations: Absent  [place]: Quiescent     Interpretation: Reactive    Patient was originally sent for evaluation of potential superimposed preeclampsia on top of chronic hypertension.  All of her blood pressures ranged from 119-133/73-81.  She was asymptomatic.  She had to leave before her blood work was resulted.  I was in a delivery and had not seen her yet.  She did sign an AMA form.  Her blood work was reviewed.  It was unremarkable.  Her PC ratio was 91.

## 2025-05-18 ENCOUNTER — HOSPITAL ENCOUNTER (EMERGENCY)
Facility: MEDICAL CENTER | Age: 25
End: 2025-05-19
Attending: OBSTETRICS & GYNECOLOGY | Admitting: OBSTETRICS & GYNECOLOGY
Payer: COMMERCIAL

## 2025-05-18 PROCEDURE — 302449 STATCHG TRIAGE ONLY (STATISTIC)

## 2025-05-18 ASSESSMENT — FIBROSIS 4 INDEX: FIB4 SCORE: 0.32

## 2025-05-19 ENCOUNTER — APPOINTMENT (OUTPATIENT)
Dept: RADIOLOGY | Facility: MEDICAL CENTER | Age: 25
End: 2025-05-19
Attending: OBSTETRICS & GYNECOLOGY
Payer: COMMERCIAL

## 2025-05-19 VITALS
DIASTOLIC BLOOD PRESSURE: 76 MMHG | TEMPERATURE: 97.2 F | SYSTOLIC BLOOD PRESSURE: 127 MMHG | HEIGHT: 65 IN | WEIGHT: 253 LBS | RESPIRATION RATE: 16 BRPM | BODY MASS INDEX: 42.15 KG/M2 | HEART RATE: 90 BPM

## 2025-05-19 LAB
ALBUMIN SERPL BCP-MCNC: 3.3 G/DL (ref 3.2–4.9)
ALBUMIN/GLOB SERPL: 1 G/DL
ALP SERPL-CCNC: 128 U/L (ref 30–99)
ALT SERPL-CCNC: 10 U/L (ref 2–50)
ANION GAP SERPL CALC-SCNC: 11 MMOL/L (ref 7–16)
AST SERPL-CCNC: 17 U/L (ref 12–45)
BASOPHILS # BLD AUTO: 0.2 % (ref 0–1.8)
BASOPHILS # BLD: 0.03 K/UL (ref 0–0.12)
BILIRUB SERPL-MCNC: <0.2 MG/DL (ref 0.1–1.5)
BUN SERPL-MCNC: 4 MG/DL (ref 8–22)
CALCIUM ALBUM COR SERPL-MCNC: 9.8 MG/DL (ref 8.5–10.5)
CALCIUM SERPL-MCNC: 9.2 MG/DL (ref 8.5–10.5)
CHLORIDE SERPL-SCNC: 107 MMOL/L (ref 96–112)
CO2 SERPL-SCNC: 20 MMOL/L (ref 20–33)
CREAT SERPL-MCNC: 0.63 MG/DL (ref 0.5–1.4)
CREAT UR-MCNC: 112 MG/DL
EOSINOPHIL # BLD AUTO: 0.09 K/UL (ref 0–0.51)
EOSINOPHIL NFR BLD: 0.7 % (ref 0–6.9)
ERYTHROCYTE [DISTWIDTH] IN BLOOD BY AUTOMATED COUNT: 54 FL (ref 35.9–50)
GFR SERPLBLD CREATININE-BSD FMLA CKD-EPI: 126 ML/MIN/1.73 M 2
GLOBULIN SER CALC-MCNC: 3.4 G/DL (ref 1.9–3.5)
GLUCOSE SERPL-MCNC: 79 MG/DL (ref 65–99)
HCT VFR BLD AUTO: 36.5 % (ref 37–47)
HGB BLD-MCNC: 11.4 G/DL (ref 12–16)
IMM GRANULOCYTES # BLD AUTO: 0.05 K/UL (ref 0–0.11)
IMM GRANULOCYTES NFR BLD AUTO: 0.4 % (ref 0–0.9)
LYMPHOCYTES # BLD AUTO: 2.64 K/UL (ref 1–4.8)
LYMPHOCYTES NFR BLD: 20.2 % (ref 22–41)
MCH RBC QN AUTO: 26.4 PG (ref 27–33)
MCHC RBC AUTO-ENTMCNC: 31.2 G/DL (ref 32.2–35.5)
MCV RBC AUTO: 84.5 FL (ref 81.4–97.8)
MONOCYTES # BLD AUTO: 0.82 K/UL (ref 0–0.85)
MONOCYTES NFR BLD AUTO: 6.3 % (ref 0–13.4)
NEUTROPHILS # BLD AUTO: 9.42 K/UL (ref 1.82–7.42)
NEUTROPHILS NFR BLD: 72.2 % (ref 44–72)
NRBC # BLD AUTO: 0 K/UL
NRBC BLD-RTO: 0 /100 WBC (ref 0–0.2)
PLATELET # BLD AUTO: 356 K/UL (ref 164–446)
PMV BLD AUTO: 9.8 FL (ref 9–12.9)
POTASSIUM SERPL-SCNC: 3.8 MMOL/L (ref 3.6–5.5)
PROT SERPL-MCNC: 6.7 G/DL (ref 6–8.2)
PROT UR-MCNC: 11.4 MG/DL (ref 0–15)
PROT/CREAT UR: 102 MG/G (ref 10–107)
RBC # BLD AUTO: 4.32 M/UL (ref 4.2–5.4)
SODIUM SERPL-SCNC: 138 MMOL/L (ref 135–145)
WBC # BLD AUTO: 13.1 K/UL (ref 4.8–10.8)

## 2025-05-19 PROCEDURE — 85025 COMPLETE CBC W/AUTO DIFF WBC: CPT

## 2025-05-19 PROCEDURE — 59025 FETAL NON-STRESS TEST: CPT | Mod: XU

## 2025-05-19 PROCEDURE — 80053 COMPREHEN METABOLIC PANEL: CPT

## 2025-05-19 PROCEDURE — 36415 COLL VENOUS BLD VENIPUNCTURE: CPT

## 2025-05-19 PROCEDURE — 82570 ASSAY OF URINE CREATININE: CPT

## 2025-05-19 PROCEDURE — 76819 FETAL BIOPHYS PROFIL W/O NST: CPT

## 2025-05-19 PROCEDURE — 84156 ASSAY OF PROTEIN URINE: CPT

## 2025-05-19 NOTE — PROGRESS NOTES
"2315  at 33w5d presents to L&D with c/o dizziness, blurred vision and non painful \"brain zaps\" x3days. Pt is unable to describe the feeling in her head other than \"Brain zap.\" Pt reports she started taking zoloft at the end of April when she was hospitalized at [facility]. The patient reports she had been taking labetalol during pregnancy but, per the office APRN, she stopped one week ago because she was having headaches.     2350 report to Dr. Franks. Lab orders received.    0032 BPP ordered.    0150 pt reports no headache or pain. Report called to Dr. Franks. Discharge orders received.    0200 discharge instructions reviewed with pt. Pt will call office for an appt with Dr. Smith. All questions answered. Pt verbalizes understanding. Pt discharged home undelivered.    "

## 2025-05-22 ENCOUNTER — HOSPITAL ENCOUNTER (OUTPATIENT)
Facility: MEDICAL CENTER | Age: 25
End: 2025-05-22
Attending: OBSTETRICS & GYNECOLOGY | Admitting: OBSTETRICS & GYNECOLOGY
Payer: COMMERCIAL

## 2025-05-22 VITALS
DIASTOLIC BLOOD PRESSURE: 76 MMHG | HEART RATE: 106 BPM | SYSTOLIC BLOOD PRESSURE: 134 MMHG | OXYGEN SATURATION: 98 % | RESPIRATION RATE: 16 BRPM | TEMPERATURE: 97.2 F

## 2025-05-22 PROCEDURE — 59025 FETAL NON-STRESS TEST: CPT

## 2025-05-22 ASSESSMENT — PAIN SCALES - GENERAL: PAINLEVEL: 0 - NO PAIN

## 2025-05-23 NOTE — DISCHARGE INSTRUCTIONS
Keep Next Appointment 5/26    General Instructions:  If you think you are in labor, time contractions (lying on your left side) from the beginning of one contraction to the beginning of the next contraction for at least one hour.  Increase fluid intake: you should consume 10-12 8 oz glasses of non-caffeinated fluid per day.  Report any pressure or burning on urination to your physician.  Monitor fetal movement: If you notice an absence or decrease in fetal movement, drink a large glass of water and rest on your side.  If there is no increase in movement, call your physician or go to the hospital for further evaluation.  Report any sudden, sharp abdominal pain.  Report any bleeding.  Spotting or pinkish discharge is normal after vaginal exam.  You may also spot after sexual intercourse.    Pre-term Labor (<37 weeks):  Call your physician or return to the hospital if:  You have painless regular contractions more than 4 in one hour.  Your water breaks (remember time and color).  You have menstrual-like cramps, a low dull backache or pressure in your pelvis or back.  Your baby does not move enough to complete the daily kick count (10 movements in 2 hours).  Your baby moves much less often than on the days before or you have not felt your baby move all day.  Please review the MEDICATION LIST section of your AFTER VISIT SUMMARY document.  Take your medication as prescribed    Other Instructions:  Please carefully review your entire AFTER VISIT SUMMARY document for all discharge instructions.

## 2025-05-23 NOTE — PROGRESS NOTES
Non-Stress Test    Date: 25    Maternal chief complaint: Sent from [facility] for prolonged monitoring    NST Interpretation:  Baseline: 140 bpm  Variability: moderate  Accelerations: present  Decelerations: absent  Tocometer: quiet    Interpretation: Reactive and reassuring for gestational age.     Assessment:  24 year old  1 para 0 34+2 weeks (EDC 2025)   intrauterine pregnancy at 34+2 weeks.  Chronic hypertension.     Plan:  - Okay to discharge home  -  labor precautions  - Fetal kick counts  - Follow up outpatient at next scheduled visit with Dr. Smith

## 2025-05-26 ENCOUNTER — HOSPITAL ENCOUNTER (OUTPATIENT)
Facility: MEDICAL CENTER | Age: 25
End: 2025-05-26
Attending: OBSTETRICS & GYNECOLOGY | Admitting: OBSTETRICS & GYNECOLOGY
Payer: COMMERCIAL

## 2025-05-26 VITALS
RESPIRATION RATE: 18 BRPM | SYSTOLIC BLOOD PRESSURE: 134 MMHG | TEMPERATURE: 97.3 F | HEART RATE: 86 BPM | WEIGHT: 250 LBS | HEIGHT: 65 IN | BODY MASS INDEX: 41.65 KG/M2 | DIASTOLIC BLOOD PRESSURE: 88 MMHG

## 2025-05-26 PROCEDURE — 59025 FETAL NON-STRESS TEST: CPT

## 2025-05-26 ASSESSMENT — FIBROSIS 4 INDEX: FIB4 SCORE: 0.36

## 2025-05-26 NOTE — PROGRESS NOTES
"Hospital Day : 0    S: Presents for routine holiday nst; pregnancy complicated by chronic hypertension    O:  Vitals:    05/26/25 1040   BP: 134/88   Pulse: 86   Resp: 18   Temp: 36.3 °C (97.3 °F)   TempSrc: Temporal   Weight: 113 kg (250 lb)   Height: 1.651 m (5' 5\")                   Nst reactive for gestational age 130s with accells and moderate variability; no decels  [place] quiet    A: IUP at 34.6 with chronic hypertension for routine nst; fetal stratus reassuring    P: dc with fu as planned; kick counts; pih precautions    "

## 2025-05-26 NOTE — PROGRESS NOTES
1205- tracing reviewed by provider, discharge order received  1208- discharge teaching done, pt verbalized understanding.

## 2025-06-22 ENCOUNTER — APPOINTMENT (OUTPATIENT)
Dept: OBGYN | Facility: MEDICAL CENTER | Age: 25
End: 2025-06-22
Attending: OBSTETRICS & GYNECOLOGY
Payer: COMMERCIAL

## 2025-06-22 ENCOUNTER — ANESTHESIA (OUTPATIENT)
Dept: OBGYN | Facility: MEDICAL CENTER | Age: 25
End: 2025-06-22
Payer: COMMERCIAL

## 2025-06-22 ENCOUNTER — HOSPITAL ENCOUNTER (INPATIENT)
Facility: MEDICAL CENTER | Age: 25
LOS: 5 days | End: 2025-06-27
Attending: OBSTETRICS & GYNECOLOGY | Admitting: OBSTETRICS & GYNECOLOGY
Payer: COMMERCIAL

## 2025-06-22 DIAGNOSIS — G89.18 POSTOPERATIVE PAIN: Primary | ICD-10-CM

## 2025-06-22 LAB
BASOPHILS # BLD AUTO: 0.3 % (ref 0–1.8)
BASOPHILS # BLD: 0.03 K/UL (ref 0–0.12)
EOSINOPHIL # BLD AUTO: 0.02 K/UL (ref 0–0.51)
EOSINOPHIL NFR BLD: 0.2 % (ref 0–6.9)
ERYTHROCYTE [DISTWIDTH] IN BLOOD BY AUTOMATED COUNT: 47.4 FL (ref 35.9–50)
HCT VFR BLD AUTO: 37.7 % (ref 37–47)
HGB BLD-MCNC: 11.6 G/DL (ref 12–16)
HOLDING TUBE BB 8507: NORMAL
IMM GRANULOCYTES # BLD AUTO: 0.04 K/UL (ref 0–0.11)
IMM GRANULOCYTES NFR BLD AUTO: 0.4 % (ref 0–0.9)
LYMPHOCYTES # BLD AUTO: 2.16 K/UL (ref 1–4.8)
LYMPHOCYTES NFR BLD: 21.4 % (ref 22–41)
MCH RBC QN AUTO: 25.4 PG (ref 27–33)
MCHC RBC AUTO-ENTMCNC: 30.8 G/DL (ref 32.2–35.5)
MCV RBC AUTO: 82.5 FL (ref 81.4–97.8)
MONOCYTES # BLD AUTO: 0.57 K/UL (ref 0–0.85)
MONOCYTES NFR BLD AUTO: 5.6 % (ref 0–13.4)
NEUTROPHILS # BLD AUTO: 7.29 K/UL (ref 1.82–7.42)
NEUTROPHILS NFR BLD: 72.1 % (ref 44–72)
NRBC # BLD AUTO: 0 K/UL
NRBC BLD-RTO: 0 /100 WBC (ref 0–0.2)
PLATELET # BLD AUTO: 385 K/UL (ref 164–446)
PMV BLD AUTO: 9.9 FL (ref 9–12.9)
RBC # BLD AUTO: 4.57 M/UL (ref 4.2–5.4)
T PALLIDUM AB SER QL IA: NORMAL
WBC # BLD AUTO: 10.1 K/UL (ref 4.8–10.8)

## 2025-06-22 PROCEDURE — 36415 COLL VENOUS BLD VENIPUNCTURE: CPT

## 2025-06-22 PROCEDURE — 85025 COMPLETE CBC W/AUTO DIFF WBC: CPT

## 2025-06-22 PROCEDURE — 700102 HCHG RX REV CODE 250 W/ 637 OVERRIDE(OP): Performed by: OBSTETRICS & GYNECOLOGY

## 2025-06-22 PROCEDURE — 86780 TREPONEMA PALLIDUM: CPT

## 2025-06-22 PROCEDURE — A9270 NON-COVERED ITEM OR SERVICE: HCPCS | Performed by: OBSTETRICS & GYNECOLOGY

## 2025-06-22 PROCEDURE — 770002 HCHG ROOM/CARE - OB PRIVATE (112)

## 2025-06-22 RX ORDER — OXYTOCIN 10 [USP'U]/ML
10 INJECTION, SOLUTION INTRAMUSCULAR; INTRAVENOUS
Status: DISCONTINUED | OUTPATIENT
Start: 2025-06-22 | End: 2025-06-24 | Stop reason: HOSPADM

## 2025-06-22 RX ORDER — ONDANSETRON 4 MG/1
4 TABLET, ORALLY DISINTEGRATING ORAL EVERY 6 HOURS PRN
Status: DISCONTINUED | OUTPATIENT
Start: 2025-06-22 | End: 2025-06-24 | Stop reason: HOSPADM

## 2025-06-22 RX ORDER — IBUPROFEN 800 MG/1
800 TABLET, FILM COATED ORAL
Status: DISCONTINUED | OUTPATIENT
Start: 2025-06-22 | End: 2025-06-24 | Stop reason: HOSPADM

## 2025-06-22 RX ORDER — LIDOCAINE HYDROCHLORIDE 10 MG/ML
20 INJECTION, SOLUTION INFILTRATION; PERINEURAL
Status: DISCONTINUED | OUTPATIENT
Start: 2025-06-22 | End: 2025-06-24 | Stop reason: HOSPADM

## 2025-06-22 RX ORDER — ACETAMINOPHEN 500 MG
1000 TABLET ORAL
Status: DISCONTINUED | OUTPATIENT
Start: 2025-06-22 | End: 2025-06-24 | Stop reason: HOSPADM

## 2025-06-22 RX ORDER — TERBUTALINE SULFATE 1 MG/ML
0.25 INJECTION SUBCUTANEOUS
Status: DISCONTINUED | OUTPATIENT
Start: 2025-06-22 | End: 2025-06-24 | Stop reason: HOSPADM

## 2025-06-22 RX ORDER — SODIUM CHLORIDE, SODIUM LACTATE, POTASSIUM CHLORIDE, CALCIUM CHLORIDE 600; 310; 30; 20 MG/100ML; MG/100ML; MG/100ML; MG/100ML
INJECTION, SOLUTION INTRAVENOUS CONTINUOUS
Status: DISCONTINUED | OUTPATIENT
Start: 2025-06-22 | End: 2025-06-27 | Stop reason: HOSPADM

## 2025-06-22 RX ORDER — ONDANSETRON 2 MG/ML
4 INJECTION INTRAMUSCULAR; INTRAVENOUS EVERY 6 HOURS PRN
Status: DISCONTINUED | OUTPATIENT
Start: 2025-06-22 | End: 2025-06-24 | Stop reason: HOSPADM

## 2025-06-22 RX ADMIN — MISOPROSTOL 25 MCG: 100 TABLET ORAL at 23:05

## 2025-06-22 ASSESSMENT — FIBROSIS 4 INDEX: FIB4 SCORE: 0.38

## 2025-06-22 ASSESSMENT — PAIN DESCRIPTION - PAIN TYPE: TYPE: ACUTE PAIN

## 2025-06-23 ENCOUNTER — ANESTHESIA EVENT (OUTPATIENT)
Dept: OBGYN | Facility: MEDICAL CENTER | Age: 25
End: 2025-06-23
Payer: COMMERCIAL

## 2025-06-23 PROCEDURE — 700105 HCHG RX REV CODE 258: Performed by: STUDENT IN AN ORGANIZED HEALTH CARE EDUCATION/TRAINING PROGRAM

## 2025-06-23 PROCEDURE — 770002 HCHG ROOM/CARE - OB PRIVATE (112)

## 2025-06-23 PROCEDURE — A9270 NON-COVERED ITEM OR SERVICE: HCPCS | Performed by: OBSTETRICS & GYNECOLOGY

## 2025-06-23 PROCEDURE — 700102 HCHG RX REV CODE 250 W/ 637 OVERRIDE(OP): Performed by: OBSTETRICS & GYNECOLOGY

## 2025-06-23 PROCEDURE — 700111 HCHG RX REV CODE 636 W/ 250 OVERRIDE (IP): Performed by: STUDENT IN AN ORGANIZED HEALTH CARE EDUCATION/TRAINING PROGRAM

## 2025-06-23 PROCEDURE — 303615 HCHG EPIDURAL/SPINAL ANESTHESIA FOR LABOR

## 2025-06-23 PROCEDURE — 700105 HCHG RX REV CODE 258: Performed by: OBSTETRICS & GYNECOLOGY

## 2025-06-23 PROCEDURE — 700111 HCHG RX REV CODE 636 W/ 250 OVERRIDE (IP): Performed by: OBSTETRICS & GYNECOLOGY

## 2025-06-23 PROCEDURE — 0U7C7DJ DILATION OF CERVIX WITH INTRALUMINAL DEVICE, TEMPORARY, VIA NATURAL OR ARTIFICIAL OPENING: ICD-10-PCS | Performed by: OBSTETRICS & GYNECOLOGY

## 2025-06-23 PROCEDURE — 10907ZC DRAINAGE OF AMNIOTIC FLUID, THERAPEUTIC FROM PRODUCTS OF CONCEPTION, VIA NATURAL OR ARTIFICIAL OPENING: ICD-10-PCS | Performed by: OBSTETRICS & GYNECOLOGY

## 2025-06-23 PROCEDURE — 10H07YZ INSERTION OF OTHER DEVICE INTO PRODUCTS OF CONCEPTION, VIA NATURAL OR ARTIFICIAL OPENING: ICD-10-PCS | Performed by: OBSTETRICS & GYNECOLOGY

## 2025-06-23 PROCEDURE — 700101 HCHG RX REV CODE 250: Performed by: STUDENT IN AN ORGANIZED HEALTH CARE EDUCATION/TRAINING PROGRAM

## 2025-06-23 PROCEDURE — 700111 HCHG RX REV CODE 636 W/ 250 OVERRIDE (IP): Mod: JZ | Performed by: OBSTETRICS & GYNECOLOGY

## 2025-06-23 PROCEDURE — 700101 HCHG RX REV CODE 250: Performed by: OBSTETRICS & GYNECOLOGY

## 2025-06-23 RX ORDER — EPHEDRINE SULFATE 50 MG/ML
5 INJECTION, SOLUTION INTRAVENOUS
Status: DISCONTINUED | OUTPATIENT
Start: 2025-06-23 | End: 2025-06-24 | Stop reason: HOSPADM

## 2025-06-23 RX ORDER — SODIUM CHLORIDE, SODIUM LACTATE, POTASSIUM CHLORIDE, AND CALCIUM CHLORIDE .6; .31; .03; .02 G/100ML; G/100ML; G/100ML; G/100ML
250 INJECTION, SOLUTION INTRAVENOUS PRN
Status: DISCONTINUED | OUTPATIENT
Start: 2025-06-23 | End: 2025-06-24 | Stop reason: HOSPADM

## 2025-06-23 RX ORDER — SODIUM CHLORIDE, SODIUM LACTATE, POTASSIUM CHLORIDE, AND CALCIUM CHLORIDE .6; .31; .03; .02 G/100ML; G/100ML; G/100ML; G/100ML
1000 INJECTION, SOLUTION INTRAVENOUS
Status: COMPLETED | OUTPATIENT
Start: 2025-06-23 | End: 2025-06-23

## 2025-06-23 RX ORDER — BUPIVACAINE HYDROCHLORIDE 2.5 MG/ML
INJECTION, SOLUTION EPIDURAL; INFILTRATION; INTRACAUDAL; PERINEURAL
Status: COMPLETED
Start: 2025-06-23 | End: 2025-06-23

## 2025-06-23 RX ORDER — LIDOCAINE HYDROCHLORIDE AND EPINEPHRINE 15; 5 MG/ML; UG/ML
INJECTION, SOLUTION EPIDURAL
Status: COMPLETED | OUTPATIENT
Start: 2025-06-23 | End: 2025-06-23

## 2025-06-23 RX ORDER — BUPIVACAINE HYDROCHLORIDE 2.5 MG/ML
INJECTION, SOLUTION EPIDURAL; INFILTRATION; INTRACAUDAL; PERINEURAL PRN
Status: DISCONTINUED | OUTPATIENT
Start: 2025-06-23 | End: 2025-06-24 | Stop reason: SURG

## 2025-06-23 RX ORDER — DIPHENHYDRAMINE HYDROCHLORIDE 50 MG/ML
25 INJECTION, SOLUTION INTRAMUSCULAR; INTRAVENOUS ONCE
Status: COMPLETED | OUTPATIENT
Start: 2025-06-23 | End: 2025-06-23

## 2025-06-23 RX ORDER — ROPIVACAINE HYDROCHLORIDE 2 MG/ML
INJECTION, SOLUTION EPIDURAL; INFILTRATION; PERINEURAL CONTINUOUS
Status: DISCONTINUED | OUTPATIENT
Start: 2025-06-23 | End: 2025-06-27 | Stop reason: HOSPADM

## 2025-06-23 RX ORDER — ROPIVACAINE HYDROCHLORIDE 2 MG/ML
INJECTION, SOLUTION EPIDURAL; INFILTRATION
Status: COMPLETED | OUTPATIENT
Start: 2025-06-23 | End: 2025-06-23

## 2025-06-23 RX ADMIN — SODIUM CHLORIDE, POTASSIUM CHLORIDE, SODIUM LACTATE AND CALCIUM CHLORIDE 1000 ML: 600; 310; 30; 20 INJECTION, SOLUTION INTRAVENOUS at 09:09

## 2025-06-23 RX ADMIN — MISOPROSTOL 25 MCG: 100 TABLET ORAL at 03:36

## 2025-06-23 RX ADMIN — ROPIVACAINE HYDROCHLORIDE 5 ML: 2 INJECTION EPIDURAL; INFILTRATION; PERINEURAL at 09:32

## 2025-06-23 RX ADMIN — BUPIVACAINE HYDROCHLORIDE 5 ML: 2.5 INJECTION, SOLUTION EPIDURAL; INFILTRATION; INTRACAUDAL at 22:31

## 2025-06-23 RX ADMIN — WATER 2.5 MILLION UNITS: 1 INJECTION INTRAMUSCULAR; INTRAVENOUS; SUBCUTANEOUS at 18:47

## 2025-06-23 RX ADMIN — ONDANSETRON 4 MG: 2 INJECTION INTRAMUSCULAR; INTRAVENOUS at 09:59

## 2025-06-23 RX ADMIN — FENTANYL CITRATE 100 MCG: 50 INJECTION, SOLUTION INTRAMUSCULAR; INTRAVENOUS at 08:36

## 2025-06-23 RX ADMIN — ROPIVACAINE HYDROCHLORIDE: 2 INJECTION, SOLUTION EPIDURAL; INFILTRATION; PERINEURAL at 16:47

## 2025-06-23 RX ADMIN — WATER 2.5 MILLION UNITS: 1 INJECTION INTRAMUSCULAR; INTRAVENOUS; SUBCUTANEOUS at 13:28

## 2025-06-23 RX ADMIN — SODIUM CHLORIDE 5 MILLION UNITS: 900 INJECTION INTRAVENOUS at 08:46

## 2025-06-23 RX ADMIN — WATER 2.5 MILLION UNITS: 1 INJECTION INTRAMUSCULAR; INTRAVENOUS; SUBCUTANEOUS at 22:32

## 2025-06-23 RX ADMIN — ROPIVACAINE HYDROCHLORIDE: 2 INJECTION, SOLUTION EPIDURAL; INFILTRATION; PERINEURAL at 22:34

## 2025-06-23 RX ADMIN — DIPHENHYDRAMINE HYDROCHLORIDE 25 MG: 50 INJECTION, SOLUTION INTRAMUSCULAR; INTRAVENOUS at 22:49

## 2025-06-23 RX ADMIN — ROPIVACAINE HYDROCHLORIDE: 2 INJECTION, SOLUTION EPIDURAL; INFILTRATION; PERINEURAL at 09:52

## 2025-06-23 RX ADMIN — LIDOCAINE HYDROCHLORIDE,EPINEPHRINE BITARTRATE 3 ML: 15; .005 INJECTION, SOLUTION EPIDURAL; INFILTRATION; INTRACAUDAL; PERINEURAL at 09:32

## 2025-06-23 RX ADMIN — SODIUM CHLORIDE, POTASSIUM CHLORIDE, SODIUM LACTATE AND CALCIUM CHLORIDE: 600; 310; 30; 20 INJECTION, SOLUTION INTRAVENOUS at 08:43

## 2025-06-23 SDOH — ECONOMIC STABILITY: TRANSPORTATION INSECURITY
IN THE PAST 12 MONTHS, HAS THE LACK OF TRANSPORTATION KEPT YOU FROM MEDICAL APPOINTMENTS OR FROM GETTING MEDICATIONS?: NO

## 2025-06-23 SDOH — ECONOMIC STABILITY: TRANSPORTATION INSECURITY
IN THE PAST 12 MONTHS, HAS LACK OF RELIABLE TRANSPORTATION KEPT YOU FROM MEDICAL APPOINTMENTS, MEETINGS, WORK OR FROM GETTING THINGS NEEDED FOR DAILY LIVING?: NO

## 2025-06-23 ASSESSMENT — PAIN DESCRIPTION - PAIN TYPE
TYPE: ACUTE PAIN

## 2025-06-23 ASSESSMENT — SOCIAL DETERMINANTS OF HEALTH (SDOH)
WITHIN THE LAST YEAR, HAVE YOU BEEN AFRAID OF YOUR PARTNER OR EX-PARTNER?: NO
WITHIN THE PAST 12 MONTHS, THE FOOD YOU BOUGHT JUST DIDN'T LAST AND YOU DIDN'T HAVE MONEY TO GET MORE: NEVER TRUE
WITHIN THE LAST YEAR, HAVE TO BEEN RAPED OR FORCED TO HAVE ANY KIND OF SEXUAL ACTIVITY BY YOUR PARTNER OR EX-PARTNER?: NO
WITHIN THE PAST 12 MONTHS, YOU WORRIED THAT YOUR FOOD WOULD RUN OUT BEFORE YOU GOT THE MONEY TO BUY MORE: NEVER TRUE
IN THE PAST 12 MONTHS, HAS THE ELECTRIC, GAS, OIL, OR WATER COMPANY THREATENED TO SHUT OFF SERVICE IN YOUR HOME?: NO
WITHIN THE LAST YEAR, HAVE YOU BEEN KICKED, HIT, SLAPPED, OR OTHERWISE PHYSICALLY HURT BY YOUR PARTNER OR EX-PARTNER?: NO
WITHIN THE LAST YEAR, HAVE YOU BEEN HUMILIATED OR EMOTIONALLY ABUSED IN OTHER WAYS BY YOUR PARTNER OR EX-PARTNER?: NO

## 2025-06-23 ASSESSMENT — LIFESTYLE VARIABLES
ALCOHOL_USE: NO
HAVE YOU EVER FELT YOU SHOULD CUT DOWN ON YOUR DRINKING: NO

## 2025-06-23 ASSESSMENT — PATIENT HEALTH QUESTIONNAIRE - PHQ9
2. FEELING DOWN, DEPRESSED, IRRITABLE, OR HOPELESS: NOT AT ALL
1. LITTLE INTEREST OR PLEASURE IN DOING THINGS: NOT AT ALL
SUM OF ALL RESPONSES TO PHQ9 QUESTIONS 1 AND 2: 0

## 2025-06-23 NOTE — PROGRESS NOTES
here for an IOL related to GHTN at 38.5 weeks. Pt denies LOF, Uc's or VB.  Pt states that she hasn't received any education regarding the induction process. RN educated the pt extensively on induction process. External Monitors applied X2. IV started. VSS.    Report to Dr. Velazquez. Orders received for induction protocols.   700 Report to Colleen ALCANTARA. Care Relinquished.

## 2025-06-23 NOTE — CARE PLAN
The patient is Stable - Low risk of patient condition declining or worsening    Shift Goals  Clinical Goals: Progress labor safely.  Patient Goals: Healthy mom and baby.  Family Goals: Support.    Progress made toward(s) clinical / shift goals:  Progressing     Problem: Knowledge Deficit - L&D  Goal: Patient and family/caregivers will demonstrate understanding of plan of care, disease process/condition, diagnostic tests and medications  Outcome: Progressing   Pt continually updated and educated on ongoing POC       Problem: Pain  Goal: Patient's pain will be alleviated or reduced to the patient’s comfort goal  Outcome: Progressing   Pt states she is experiencing adequate pain control     Patient is not progressing towards the following goals:

## 2025-06-23 NOTE — ANESTHESIA PREPROCEDURE EVALUATION
Date/Time: 06/23/25 0917    Scheduled providers: Ob Inductions    Procedure: INDUCTION OF LABOR    Location: Labor & Delivery Procedure            Relevant Problems   No relevant active problems       Physical Exam    Airway   Mallampati: II  TM distance: >3 FB  Neck ROM: full       Cardiovascular - normal exam  Rhythm: regular  Rate: normal    (-) murmur     Dental - normal exam           Pulmonary - normal examBreath sounds clear to auscultation     Abdominal    Neurological - normal exam                   Anesthesia Plan    ASA 3   ASA physical status 3 criteria: morbid obesity - BMI greater than or equal to 40    Plan - epidural   Neuraxial block will be labor analgesia                  Pertinent diagnostic labs and testing reviewed    Informed Consent:    Anesthetic plan and risks discussed with patient.

## 2025-06-23 NOTE — PROGRESS NOTES
0700: Assumed care of pt. AAO, pt using B/R techniques through . POC discussed, pt verbalized understanding. FOB asleep at bedside.  1353: Dr. Smith updated on pt's status and FHT.

## 2025-06-23 NOTE — H&P
DATE OF ADMISSION:  2025     CHIEF COMPLAINT:  Induction of labor for chronic hypertension.     HISTORY OF PRESENT ILLNESS:  The patient is a 25-year-old female,  2,   para 0 at 38-1/2 weeks' gestational age.  She has a diagnosis of chronic   hypertension.  She has been following with the high-risk pregnancy center.    This pregnancy has been complicated by having to be on a 72-hour cycle at   [facility] whereupon she left against medical advice.  She   has continued to follow up in the office for her NSTs.  She was originally on   labetalol 100 mg twice a day, but stated that she was not tolerating that as   of about 3 weeks ago and quit taking it.  Blood pressures since then have been   normal to mild range.     PAST MEDICAL HISTORY:  Includes anemia.     PAST SURGICAL HISTORY:  Negative.     SOCIAL HISTORY:  The patient was positive for chlamydia at the beginning of   pregnancy and test of cure in March was negative.     ALLERGIES:  SHE HAS ALLERGIES TO SULFA AND AUGMENTIN.     CURRENT MEDICATIONS:  Include prenatal vitamins and a baby aspirin.     LABORATORY DATA:  Shows a blood type of O positive, rubella immune, her group   B strep culture is positive.     PHYSICAL EXAMINATION:  VITAL SIGNS:  While she has been on labor and delivery, have ranged with   current 136/79, 138/88 appears to be the highest, 123/71 the lowest.  CARDIOVASCULAR:  Regular rate and rhythm.  LUNGS:  Clear.  ABDOMEN:  Gravid.  PELVIC:  Vaginal exam on my presentation and examination is fingertip, 60 and   -3.  Fetal heart tones are currently 140s and category 1 reactive.  She is   chago about every 2-3 minutes.     ASSESSMENT AND PLAN:  1.  This is a 25-year-old female,  2, para 0 at 38-1/2 weeks   gestational age.  The patient was admitted to the hospital last night.  She   has received two doses of Cytotec overnight.  I just placed a Cook's balloon   catheter with 60 mL in the uterine balloon  and 80 mL in the vaginal balloon.  2.  Chronic hypertension.  Blood pressure is stable so far off of blood   pressure medication and last set of labs was 05/19.  She had a protein   creatinine ratio of 102.  3.  Group B strep positive and we will start prophylactic IV antibiotics when   she gets into more active labor.        ______________________________  Corinne E. Capurro, MD CEC/ZULMA    DD:  06/23/2025 08:06  DT:  06/23/2025 08:20    Job#:  [id number]

## 2025-06-23 NOTE — CARE PLAN
The patient is Stable - Low risk of patient condition declining or worsening    Shift Goals  Clinical Goals: Dilation and delivery  Patient Goals: support through UC.  Family Goals: support, safe delivery of healthy baby girl    Progress made toward(s) clinical / shift goals:  support

## 2025-06-23 NOTE — ANESTHESIA PROCEDURE NOTES
Epidural Block    Date/Time: 6/23/2025 9:32 AM    Performed by: Oneil Mcgregor D.O.  Authorized by: Oneil Mcgregor D.O.    Patient Location:  OB  Start Time:  6/23/2025 9:32 AM  End Time:  6/23/2025 9:39 AM  Reason for Block: labor analgesia    patient identified, IV checked, site marked, risks and benefits discussed, surgical consent, monitors and equipment checked and pre-op evaluation    Patient Position:  Sitting  Prep: ChloraPrep, patient draped and sterile technique    Monitoring:  Blood pressure, continuous pulse oximetry and heart rate  Approach:  Midline  Location:  L4-L5  Injection Technique:  ERIN air and ERIN saline  Skin infiltration:  Lidocaine  Strength:  1%  Dose:  3ml  Needle Type:  Tuohy  Needle Gauge:  17 G  Needle Length:  3.5 in  Loss of resistance::  8  Catheter Size:  19 G  Catheter at Skin Depth:  14  Test Dose Result:  Negative

## 2025-06-23 NOTE — PROGRESS NOTES
Balloon fell out  Ve: 7/90  Fht's: 130s cat 1  [place]: q3min  Arom - clear, IUPC placed  Restart pit

## 2025-06-24 ENCOUNTER — SURGERY (OUTPATIENT)
Age: 25
End: 2025-06-24
Payer: COMMERCIAL

## 2025-06-24 PROCEDURE — 160041 HCHG SURGERY MINUTES - EA ADDL 1 MIN LEVEL 4: Performed by: OBSTETRICS & GYNECOLOGY

## 2025-06-24 PROCEDURE — A9270 NON-COVERED ITEM OR SERVICE: HCPCS | Performed by: ANESTHESIOLOGY

## 2025-06-24 PROCEDURE — 700101 HCHG RX REV CODE 250: Performed by: OBSTETRICS & GYNECOLOGY

## 2025-06-24 PROCEDURE — 700102 HCHG RX REV CODE 250 W/ 637 OVERRIDE(OP): Performed by: OBSTETRICS & GYNECOLOGY

## 2025-06-24 PROCEDURE — A9270 NON-COVERED ITEM OR SERVICE: HCPCS | Performed by: OBSTETRICS & GYNECOLOGY

## 2025-06-24 PROCEDURE — 770002 HCHG ROOM/CARE - OB PRIVATE (112)

## 2025-06-24 PROCEDURE — 700111 HCHG RX REV CODE 636 W/ 250 OVERRIDE (IP): Mod: JZ | Performed by: STUDENT IN AN ORGANIZED HEALTH CARE EDUCATION/TRAINING PROGRAM

## 2025-06-24 PROCEDURE — A9270 NON-COVERED ITEM OR SERVICE: HCPCS | Performed by: STUDENT IN AN ORGANIZED HEALTH CARE EDUCATION/TRAINING PROGRAM

## 2025-06-24 PROCEDURE — 160029 HCHG SURGERY MINUTES - 1ST 30 MINS LEVEL 4: Performed by: OBSTETRICS & GYNECOLOGY

## 2025-06-24 PROCEDURE — C1755 CATHETER, INTRASPINAL: HCPCS | Performed by: OBSTETRICS & GYNECOLOGY

## 2025-06-24 PROCEDURE — 700101 HCHG RX REV CODE 250: Performed by: STUDENT IN AN ORGANIZED HEALTH CARE EDUCATION/TRAINING PROGRAM

## 2025-06-24 PROCEDURE — 160002 HCHG RECOVERY MINUTES (STAT): Performed by: OBSTETRICS & GYNECOLOGY

## 2025-06-24 PROCEDURE — 700111 HCHG RX REV CODE 636 W/ 250 OVERRIDE (IP): Mod: JZ | Performed by: OBSTETRICS & GYNECOLOGY

## 2025-06-24 PROCEDURE — 700105 HCHG RX REV CODE 258: Performed by: STUDENT IN AN ORGANIZED HEALTH CARE EDUCATION/TRAINING PROGRAM

## 2025-06-24 PROCEDURE — 700105 HCHG RX REV CODE 258: Performed by: OBSTETRICS & GYNECOLOGY

## 2025-06-24 PROCEDURE — 160048 HCHG OR STATISTICAL LEVEL 1-5: Performed by: OBSTETRICS & GYNECOLOGY

## 2025-06-24 PROCEDURE — 160192 HCHG ANESTHESIA COMPLEX: Performed by: OBSTETRICS & GYNECOLOGY

## 2025-06-24 PROCEDURE — 160193 HCHG PACU STANDARD - 1ST 60 MINS: Performed by: OBSTETRICS & GYNECOLOGY

## 2025-06-24 PROCEDURE — 700102 HCHG RX REV CODE 250 W/ 637 OVERRIDE(OP): Performed by: STUDENT IN AN ORGANIZED HEALTH CARE EDUCATION/TRAINING PROGRAM

## 2025-06-24 PROCEDURE — 700102 HCHG RX REV CODE 250 W/ 637 OVERRIDE(OP): Performed by: ANESTHESIOLOGY

## 2025-06-24 RX ORDER — DIPHENHYDRAMINE HYDROCHLORIDE 50 MG/ML
25 INJECTION, SOLUTION INTRAMUSCULAR; INTRAVENOUS EVERY 6 HOURS PRN
Status: DISPENSED | OUTPATIENT
Start: 2025-06-24 | End: 2025-06-25

## 2025-06-24 RX ORDER — SODIUM CHLORIDE, SODIUM LACTATE, POTASSIUM CHLORIDE, CALCIUM CHLORIDE 600; 310; 30; 20 MG/100ML; MG/100ML; MG/100ML; MG/100ML
2000 INJECTION, SOLUTION INTRAVENOUS PRN
Status: DISCONTINUED | OUTPATIENT
Start: 2025-06-24 | End: 2025-06-27 | Stop reason: HOSPADM

## 2025-06-24 RX ORDER — IBUPROFEN 800 MG/1
800 TABLET, FILM COATED ORAL EVERY 8 HOURS
Status: DISCONTINUED | OUTPATIENT
Start: 2025-06-25 | End: 2025-06-27 | Stop reason: HOSPADM

## 2025-06-24 RX ORDER — DIPHENHYDRAMINE HYDROCHLORIDE 50 MG/ML
12.5 INJECTION, SOLUTION INTRAMUSCULAR; INTRAVENOUS EVERY 6 HOURS PRN
Status: ACTIVE | OUTPATIENT
Start: 2025-06-24 | End: 2025-06-25

## 2025-06-24 RX ORDER — IBUPROFEN 800 MG/1
800 TABLET, FILM COATED ORAL EVERY 8 HOURS PRN
Status: DISCONTINUED | OUTPATIENT
Start: 2025-06-28 | End: 2025-06-27 | Stop reason: HOSPADM

## 2025-06-24 RX ORDER — ONDANSETRON 4 MG/1
4 TABLET, ORALLY DISINTEGRATING ORAL EVERY 6 HOURS PRN
Status: DISCONTINUED | OUTPATIENT
Start: 2025-06-25 | End: 2025-06-27 | Stop reason: HOSPADM

## 2025-06-24 RX ORDER — SIMETHICONE 125 MG
125 TABLET,CHEWABLE ORAL 4 TIMES DAILY PRN
Status: DISCONTINUED | OUTPATIENT
Start: 2025-06-24 | End: 2025-06-27 | Stop reason: HOSPADM

## 2025-06-24 RX ORDER — ONDANSETRON 2 MG/ML
INJECTION INTRAMUSCULAR; INTRAVENOUS PRN
Status: DISCONTINUED | OUTPATIENT
Start: 2025-06-24 | End: 2025-06-24 | Stop reason: SURG

## 2025-06-24 RX ORDER — HYDRALAZINE HYDROCHLORIDE 20 MG/ML
5 INJECTION INTRAMUSCULAR; INTRAVENOUS
Status: DISCONTINUED | OUTPATIENT
Start: 2025-06-24 | End: 2025-06-24 | Stop reason: HOSPADM

## 2025-06-24 RX ORDER — ACETAMINOPHEN 500 MG
1000 TABLET ORAL EVERY 6 HOURS PRN
Status: DISCONTINUED | OUTPATIENT
Start: 2025-06-27 | End: 2025-06-27 | Stop reason: HOSPADM

## 2025-06-24 RX ORDER — HYDROMORPHONE HYDROCHLORIDE 1 MG/ML
0.1 INJECTION, SOLUTION INTRAMUSCULAR; INTRAVENOUS; SUBCUTANEOUS
Status: DISCONTINUED | OUTPATIENT
Start: 2025-06-24 | End: 2025-06-24 | Stop reason: HOSPADM

## 2025-06-24 RX ORDER — HYDROMORPHONE HYDROCHLORIDE 1 MG/ML
0.2 INJECTION, SOLUTION INTRAMUSCULAR; INTRAVENOUS; SUBCUTANEOUS
Status: DISCONTINUED | OUTPATIENT
Start: 2025-06-24 | End: 2025-06-24 | Stop reason: HOSPADM

## 2025-06-24 RX ORDER — PHENYLEPHRINE HYDROCHLORIDE 10 MG/ML
INJECTION, SOLUTION INTRAMUSCULAR; INTRAVENOUS; SUBCUTANEOUS PRN
Status: DISCONTINUED | OUTPATIENT
Start: 2025-06-24 | End: 2025-06-24 | Stop reason: SURG

## 2025-06-24 RX ORDER — DIPHENHYDRAMINE HCL 25 MG
25 TABLET ORAL EVERY 6 HOURS PRN
Status: DISCONTINUED | OUTPATIENT
Start: 2025-06-25 | End: 2025-06-27 | Stop reason: HOSPADM

## 2025-06-24 RX ORDER — OXYCODONE HCL 5 MG/5 ML
10 SOLUTION, ORAL ORAL
Status: COMPLETED | OUTPATIENT
Start: 2025-06-24 | End: 2025-06-24

## 2025-06-24 RX ORDER — CEFAZOLIN SODIUM 1 G/3ML
INJECTION, POWDER, FOR SOLUTION INTRAMUSCULAR; INTRAVENOUS PRN
Status: DISCONTINUED | OUTPATIENT
Start: 2025-06-24 | End: 2025-06-24 | Stop reason: SURG

## 2025-06-24 RX ORDER — DIPHENHYDRAMINE HYDROCHLORIDE 50 MG/ML
12.5 INJECTION, SOLUTION INTRAMUSCULAR; INTRAVENOUS
Status: DISCONTINUED | OUTPATIENT
Start: 2025-06-24 | End: 2025-06-24 | Stop reason: HOSPADM

## 2025-06-24 RX ORDER — KETOROLAC TROMETHAMINE 15 MG/ML
15 INJECTION, SOLUTION INTRAMUSCULAR; INTRAVENOUS EVERY 6 HOURS
Status: DISCONTINUED | OUTPATIENT
Start: 2025-06-24 | End: 2025-06-24

## 2025-06-24 RX ORDER — KETOROLAC TROMETHAMINE 15 MG/ML
15 INJECTION, SOLUTION INTRAMUSCULAR; INTRAVENOUS ONCE
Status: DISCONTINUED | OUTPATIENT
Start: 2025-06-24 | End: 2025-06-24 | Stop reason: HOSPADM

## 2025-06-24 RX ORDER — ACETAMINOPHEN 500 MG
1000 TABLET ORAL EVERY 6 HOURS
Status: DISCONTINUED | OUTPATIENT
Start: 2025-06-24 | End: 2025-06-27 | Stop reason: HOSPADM

## 2025-06-24 RX ORDER — MORPHINE SULFATE 0.5 MG/ML
INJECTION, SOLUTION EPIDURAL; INTRATHECAL; INTRAVENOUS PRN
Status: DISCONTINUED | OUTPATIENT
Start: 2025-06-24 | End: 2025-06-24 | Stop reason: SURG

## 2025-06-24 RX ORDER — SCOPOLAMINE 1 MG/3D
1 PATCH, EXTENDED RELEASE TRANSDERMAL
Status: DISCONTINUED | OUTPATIENT
Start: 2025-06-24 | End: 2025-06-27 | Stop reason: HOSPADM

## 2025-06-24 RX ORDER — ALBUTEROL SULFATE 5 MG/ML
2.5 SOLUTION RESPIRATORY (INHALATION)
Status: DISCONTINUED | OUTPATIENT
Start: 2025-06-24 | End: 2025-06-24 | Stop reason: HOSPADM

## 2025-06-24 RX ORDER — OXYTOCIN 10 [USP'U]/ML
INJECTION, SOLUTION INTRAMUSCULAR; INTRAVENOUS PRN
Status: DISCONTINUED | OUTPATIENT
Start: 2025-06-24 | End: 2025-06-24 | Stop reason: SURG

## 2025-06-24 RX ORDER — ACETAMINOPHEN 500 MG
1000 TABLET ORAL EVERY 6 HOURS PRN
Status: DISCONTINUED | OUTPATIENT
Start: 2025-06-28 | End: 2025-06-24

## 2025-06-24 RX ORDER — SODIUM CHLORIDE, SODIUM LACTATE, POTASSIUM CHLORIDE, CALCIUM CHLORIDE 600; 310; 30; 20 MG/100ML; MG/100ML; MG/100ML; MG/100ML
INJECTION, SOLUTION INTRAVENOUS CONTINUOUS
Status: DISCONTINUED | OUTPATIENT
Start: 2025-06-24 | End: 2025-06-27 | Stop reason: HOSPADM

## 2025-06-24 RX ORDER — LIDOCAINE HYDROCHLORIDE AND EPINEPHRINE 20; 5 MG/ML; UG/ML
INJECTION, SOLUTION EPIDURAL; INFILTRATION; INTRACAUDAL; PERINEURAL PRN
Status: DISCONTINUED | OUTPATIENT
Start: 2025-06-24 | End: 2025-06-24 | Stop reason: SURG

## 2025-06-24 RX ORDER — SODIUM CHLORIDE, SODIUM GLUCONATE, SODIUM ACETATE, POTASSIUM CHLORIDE AND MAGNESIUM CHLORIDE 526; 502; 368; 37; 30 MG/100ML; MG/100ML; MG/100ML; MG/100ML; MG/100ML
1000 INJECTION, SOLUTION INTRAVENOUS ONCE
Status: COMPLETED | OUTPATIENT
Start: 2025-06-24 | End: 2025-06-24

## 2025-06-24 RX ORDER — METOCLOPRAMIDE HYDROCHLORIDE 5 MG/ML
10 INJECTION INTRAMUSCULAR; INTRAVENOUS ONCE
Status: COMPLETED | OUTPATIENT
Start: 2025-06-24 | End: 2025-06-24

## 2025-06-24 RX ORDER — OXYCODONE HYDROCHLORIDE 5 MG/1
5 TABLET ORAL EVERY 4 HOURS PRN
Status: ACTIVE | OUTPATIENT
Start: 2025-06-24 | End: 2025-06-25

## 2025-06-24 RX ORDER — EPHEDRINE SULFATE 50 MG/ML
10 INJECTION, SOLUTION INTRAVENOUS
Status: ACTIVE | OUTPATIENT
Start: 2025-06-24 | End: 2025-06-25

## 2025-06-24 RX ORDER — SODIUM CHLORIDE, SODIUM GLUCONATE, SODIUM ACETATE, POTASSIUM CHLORIDE AND MAGNESIUM CHLORIDE 526; 502; 368; 37; 30 MG/100ML; MG/100ML; MG/100ML; MG/100ML; MG/100ML
INJECTION, SOLUTION INTRAVENOUS
Status: DISCONTINUED | OUTPATIENT
Start: 2025-06-24 | End: 2025-06-24 | Stop reason: SURG

## 2025-06-24 RX ORDER — ACETAMINOPHEN 500 MG
1000 TABLET ORAL EVERY 6 HOURS
Status: DISCONTINUED | OUTPATIENT
Start: 2025-06-24 | End: 2025-06-24

## 2025-06-24 RX ORDER — EPHEDRINE SULFATE 50 MG/ML
5 INJECTION, SOLUTION INTRAVENOUS
Status: DISCONTINUED | OUTPATIENT
Start: 2025-06-24 | End: 2025-06-24 | Stop reason: HOSPADM

## 2025-06-24 RX ORDER — DIPHENHYDRAMINE HYDROCHLORIDE 50 MG/ML
25 INJECTION, SOLUTION INTRAMUSCULAR; INTRAVENOUS EVERY 6 HOURS PRN
Status: DISCONTINUED | OUTPATIENT
Start: 2025-06-25 | End: 2025-06-27 | Stop reason: HOSPADM

## 2025-06-24 RX ORDER — CITRIC ACID/SODIUM CITRATE 334-500MG
30 SOLUTION, ORAL ORAL ONCE
Status: COMPLETED | OUTPATIENT
Start: 2025-06-24 | End: 2025-06-24

## 2025-06-24 RX ORDER — KETOROLAC TROMETHAMINE 15 MG/ML
15 INJECTION, SOLUTION INTRAMUSCULAR; INTRAVENOUS EVERY 6 HOURS
Status: COMPLETED | OUTPATIENT
Start: 2025-06-24 | End: 2025-06-25

## 2025-06-24 RX ORDER — OXYCODONE HCL 5 MG/5 ML
5 SOLUTION, ORAL ORAL
Status: COMPLETED | OUTPATIENT
Start: 2025-06-24 | End: 2025-06-24

## 2025-06-24 RX ORDER — OXYCODONE HYDROCHLORIDE 10 MG/1
10 TABLET ORAL EVERY 4 HOURS PRN
Status: DISCONTINUED | OUTPATIENT
Start: 2025-06-25 | End: 2025-06-27 | Stop reason: HOSPADM

## 2025-06-24 RX ORDER — AZITHROMYCIN 500 MG/5ML
500 INJECTION, POWDER, LYOPHILIZED, FOR SOLUTION INTRAVENOUS ONCE
Status: COMPLETED | OUTPATIENT
Start: 2025-06-24 | End: 2025-06-24

## 2025-06-24 RX ORDER — HYDROMORPHONE HYDROCHLORIDE 1 MG/ML
0.4 INJECTION, SOLUTION INTRAMUSCULAR; INTRAVENOUS; SUBCUTANEOUS
Status: DISCONTINUED | OUTPATIENT
Start: 2025-06-24 | End: 2025-06-24 | Stop reason: HOSPADM

## 2025-06-24 RX ORDER — MEPERIDINE HYDROCHLORIDE 25 MG/ML
12.5 INJECTION INTRAMUSCULAR; INTRAVENOUS; SUBCUTANEOUS
Status: DISCONTINUED | OUTPATIENT
Start: 2025-06-24 | End: 2025-06-24 | Stop reason: HOSPADM

## 2025-06-24 RX ORDER — ACETAMINOPHEN 500 MG
1000 TABLET ORAL EVERY 6 HOURS
Status: DISCONTINUED | OUTPATIENT
Start: 2025-06-25 | End: 2025-06-24

## 2025-06-24 RX ORDER — ONDANSETRON 2 MG/ML
4 INJECTION INTRAMUSCULAR; INTRAVENOUS EVERY 6 HOURS PRN
Status: DISPENSED | OUTPATIENT
Start: 2025-06-24 | End: 2025-06-25

## 2025-06-24 RX ORDER — ONDANSETRON 2 MG/ML
4 INJECTION INTRAMUSCULAR; INTRAVENOUS
Status: DISCONTINUED | OUTPATIENT
Start: 2025-06-24 | End: 2025-06-24 | Stop reason: HOSPADM

## 2025-06-24 RX ORDER — ENOXAPARIN SODIUM 100 MG/ML
40 INJECTION SUBCUTANEOUS DAILY
Status: CANCELLED | OUTPATIENT
Start: 2025-06-24

## 2025-06-24 RX ORDER — OXYCODONE HYDROCHLORIDE 5 MG/1
5 TABLET ORAL EVERY 4 HOURS PRN
Status: DISCONTINUED | OUTPATIENT
Start: 2025-06-25 | End: 2025-06-27 | Stop reason: HOSPADM

## 2025-06-24 RX ORDER — ONDANSETRON 2 MG/ML
4 INJECTION INTRAMUSCULAR; INTRAVENOUS EVERY 6 HOURS PRN
Status: DISCONTINUED | OUTPATIENT
Start: 2025-06-25 | End: 2025-06-27 | Stop reason: HOSPADM

## 2025-06-24 RX ORDER — HALOPERIDOL 5 MG/ML
1 INJECTION INTRAMUSCULAR
Status: DISCONTINUED | OUTPATIENT
Start: 2025-06-24 | End: 2025-06-24 | Stop reason: HOSPADM

## 2025-06-24 RX ORDER — HYDROMORPHONE HYDROCHLORIDE 1 MG/ML
0.2 INJECTION, SOLUTION INTRAMUSCULAR; INTRAVENOUS; SUBCUTANEOUS
Status: ACTIVE | OUTPATIENT
Start: 2025-06-24 | End: 2025-06-25

## 2025-06-24 RX ADMIN — PHENYLEPHRINE HYDROCHLORIDE 200 MCG: 10 INJECTION INTRAVENOUS at 06:22

## 2025-06-24 RX ADMIN — OXYCODONE HYDROCHLORIDE 10 MG: 5 SOLUTION ORAL at 07:07

## 2025-06-24 RX ADMIN — FAMOTIDINE 20 MG: 10 INJECTION, SOLUTION INTRAVENOUS at 05:18

## 2025-06-24 RX ADMIN — WATER 2.5 MILLION UNITS: 1 INJECTION INTRAMUSCULAR; INTRAVENOUS; SUBCUTANEOUS at 03:36

## 2025-06-24 RX ADMIN — LIDOCAINE HYDROCHLORIDE,EPINEPHRINE BITARTRATE 10 ML: 20; .005 INJECTION, SOLUTION EPIDURAL; INFILTRATION; INTRACAUDAL; PERINEURAL at 05:46

## 2025-06-24 RX ADMIN — CEFAZOLIN 3 G: 1 INJECTION, POWDER, FOR SOLUTION INTRAMUSCULAR; INTRAVENOUS at 05:50

## 2025-06-24 RX ADMIN — KETOROLAC TROMETHAMINE 15 MG: 15 INJECTION, SOLUTION INTRAMUSCULAR; INTRAVENOUS at 18:00

## 2025-06-24 RX ADMIN — PHENYLEPHRINE HYDROCHLORIDE 100 MCG: 10 INJECTION INTRAVENOUS at 06:14

## 2025-06-24 RX ADMIN — ONDANSETRON 4 MG: 2 INJECTION INTRAMUSCULAR; INTRAVENOUS at 06:14

## 2025-06-24 RX ADMIN — SODIUM CHLORIDE, SODIUM GLUCONATE, SODIUM ACETATE, POTASSIUM CHLORIDE AND MAGNESIUM CHLORIDE 1000 ML: 526; 502; 368; 37; 30 INJECTION, SOLUTION INTRAVENOUS at 05:18

## 2025-06-24 RX ADMIN — SODIUM CITRATE AND CITRIC ACID MONOHYDRATE 30 ML: 2004; 3000 SOLUTION ORAL at 05:19

## 2025-06-24 RX ADMIN — METOCLOPRAMIDE 10 MG: 5 INJECTION, SOLUTION INTRAMUSCULAR; INTRAVENOUS at 05:18

## 2025-06-24 RX ADMIN — KETOROLAC TROMETHAMINE 15 MG: 15 INJECTION, SOLUTION INTRAMUSCULAR; INTRAVENOUS at 12:09

## 2025-06-24 RX ADMIN — ACETAMINOPHEN 1000 MG: 500 TABLET ORAL at 13:12

## 2025-06-24 RX ADMIN — SCOPOLAMINE 1 PATCH: 1.5 PATCH, EXTENDED RELEASE TRANSDERMAL at 14:33

## 2025-06-24 RX ADMIN — FENTANYL CITRATE 50 MCG: 50 INJECTION, SOLUTION INTRAMUSCULAR; INTRAVENOUS at 07:27

## 2025-06-24 RX ADMIN — AZITHROMYCIN 500 MG: 500 INJECTION, POWDER, LYOPHILIZED, FOR SOLUTION INTRAVENOUS at 05:18

## 2025-06-24 RX ADMIN — FENTANYL CITRATE 100 MCG: 50 INJECTION, SOLUTION INTRAMUSCULAR; INTRAVENOUS at 00:25

## 2025-06-24 RX ADMIN — MORPHINE SULFATE 2.5 MCG: 0.5 INJECTION, SOLUTION EPIDURAL; INTRATHECAL; INTRAVENOUS at 06:13

## 2025-06-24 RX ADMIN — ROPIVACAINE HYDROCHLORIDE: 2 INJECTION, SOLUTION EPIDURAL; INFILTRATION; PERINEURAL at 03:33

## 2025-06-24 RX ADMIN — FENTANYL CITRATE 50 MCG: 50 INJECTION, SOLUTION INTRAMUSCULAR; INTRAVENOUS at 07:35

## 2025-06-24 RX ADMIN — LIDOCAINE HYDROCHLORIDE,EPINEPHRINE BITARTRATE 10 ML: 20; .005 INJECTION, SOLUTION EPIDURAL; INFILTRATION; INTRACAUDAL; PERINEURAL at 05:59

## 2025-06-24 RX ADMIN — LIDOCAINE HYDROCHLORIDE,EPINEPHRINE BITARTRATE 10 ML: 20; .005 INJECTION, SOLUTION EPIDURAL; INFILTRATION; INTRACAUDAL; PERINEURAL at 05:41

## 2025-06-24 RX ADMIN — AZITHROMYCIN 500 MG: 500 INJECTION, POWDER, LYOPHILIZED, FOR SOLUTION INTRAVENOUS at 05:30

## 2025-06-24 RX ADMIN — OXYTOCIN 20 UNITS: 10 INJECTION, SOLUTION INTRAMUSCULAR; INTRAVENOUS at 06:11

## 2025-06-24 RX ADMIN — PHENYLEPHRINE HYDROCHLORIDE 200 MCG: 10 INJECTION INTRAVENOUS at 05:56

## 2025-06-24 RX ADMIN — HYDROMORPHONE HYDROCHLORIDE 0.4 MG: 1 INJECTION, SOLUTION INTRAMUSCULAR; INTRAVENOUS; SUBCUTANEOUS at 08:55

## 2025-06-24 RX ADMIN — FENTANYL CITRATE 100 MCG: 50 INJECTION, SOLUTION INTRAMUSCULAR; INTRAVENOUS at 02:20

## 2025-06-24 RX ADMIN — OXYTOCIN 125 ML/HR: 10 INJECTION, SOLUTION INTRAMUSCULAR; INTRAVENOUS at 09:25

## 2025-06-24 RX ADMIN — ONDANSETRON 4 MG: 2 INJECTION INTRAMUSCULAR; INTRAVENOUS at 12:27

## 2025-06-24 RX ADMIN — SODIUM CHLORIDE, SODIUM GLUCONATE, SODIUM ACETATE, POTASSIUM CHLORIDE AND MAGNESIUM CHLORIDE: 526; 502; 368; 37; 30 INJECTION, SOLUTION INTRAVENOUS at 05:45

## 2025-06-24 RX ADMIN — DIPHENHYDRAMINE HYDROCHLORIDE 25 MG: 50 INJECTION, SOLUTION INTRAMUSCULAR; INTRAVENOUS at 14:33

## 2025-06-24 ASSESSMENT — PAIN DESCRIPTION - PAIN TYPE
TYPE: ACUTE PAIN
TYPE: ACUTE PAIN;SURGICAL PAIN
TYPE: ACUTE PAIN
TYPE: ACUTE PAIN;SURGICAL PAIN
TYPE: ACUTE PAIN;SURGICAL PAIN
TYPE: ACUTE PAIN
TYPE: ACUTE PAIN;SURGICAL PAIN

## 2025-06-24 ASSESSMENT — PAIN SCALES - GENERAL: PAIN_LEVEL: 3

## 2025-06-24 NOTE — PROGRESS NOTES
S) pt has episodes of pain and then now states she is comfortable - epidural rebolus x2  O) vss  Ve: 7/90/-1  Fht's: 140s cat 1  [place]: q2-3min  A/P IUP at 38 5/7wks with CHTN   - s/p cytotec and balloon   - fetal head moved down in pelvis - continue pit - if unchanged in 2hrs will most likely call c/s

## 2025-06-24 NOTE — PROGRESS NOTES
1900 Report received from Colleen ALCANTARA. POC discussed. Pt denies needs at this time.. Pt encouraged to use call light for future needs.   2225 Dr. Mcgregor called to bedside for epidural bolus.   0020 RN called to bedside. Pt reported feeling intense pressure. No cervical change noted.   0135 RN called to bedside. Pt states that she doesn't want to proceed with a vaginal delivery because she cannot take the discomfort anymore. Dr. Smith caled to bedside.   0205 Dr. Smith to bedside. SVE performed.   0215 RN to bedside. Pt states she wants to proceed with an attempt at a vaginal delivery.    0222 Sarah back to bedside. SVE rechecked.   0430 Dr. Smith to bedside. SVE performed. No cervical change noted. Pt agreed to a Primary C/S. Dr. Smith verbally consented for procedure.   0542 Pt transferred from S222 to OR 2  0630 Pt transferred from OR 2 to PACU 1  0700 Report to Estrellita ALCANTARA. Care Relinquished.

## 2025-06-24 NOTE — CARE PLAN
The patient is Stable - Low risk of patient condition declining or worsening    Shift Goals  Clinical Goals: Safe delivery.  Patient Goals: Healthy mom and baby.  Family Goals: Support.    Progress made toward(s) clinical / shift goals:  Progressing     Problem: Knowledge Deficit - L&D  Goal: Patient and family/caregivers will demonstrate understanding of plan of care, disease process/condition, diagnostic tests and medications  Outcome: Progressing   Pt continually updated and educated on ongoing POC     Problem: Pain  Goal: Patient's pain will be alleviated or reduced to the patient’s comfort goal  Outcome: Progressing   Pt continually updated and educated on ongoing POC     Patient is not progressing towards the following goals:

## 2025-06-24 NOTE — PROGRESS NOTES
DATE OF SERVICE:  2025     SUBJECTIVE:  The patient still has been going back and forth with comfort with   her epidural.     OBJECTIVE:  VITAL SIGNS: Vital signs have been stable.  PELVIC:  Vaginal exam is 7 cm, 90% effaced, -1 station.  Fetal heart tones are   140s in category 1.  Tocometry is every 2-3 minutes.     ASSESSMENT AND PLAN:  The patient is a 25-year-old female,  2, para 0   at 39 weeks gestational age today.  She has a diagnosis of chronic   hypertension.  She started her induction of labor 2 nights ago.  She received   2 doses of Cytotec, had a balloon placement, and has been on Pitocin.  Despite   all of this, she has not progressed further than beyond 7 cm.  I am calling   an arrest of dilation.  Plan is to proceed with delivery by primary    section.  I discussed with her risks of  to include pain, bleeding,   infection, damage to internal organs, anesthetic risks, HIV, and hepatitis in   the event that a transfusion is necessary.  She understands all of the above   risk factors and has signed the proper consent forms.        ______________________________  Corinne E. Capurro, MD [facility]    DD:  2025 05:37  DT:  2025 06:55    Job#:  [id number]

## 2025-06-24 NOTE — PROGRESS NOTES
0700: Bedside report received from Enedina ALCANTARA, POC discussed.     0915: Pt transferred to , infant in arms.    0920: Bedside report given to Florin ALCANTARA, POC discussed.

## 2025-06-24 NOTE — OP REPORT
DATE OF SERVICE:  2025     PREOPERATIVE DIAGNOSES:  1.  Intrauterine pregnancy at 39 weeks.  2.  Chronic hypertension.  3.  Arrest of dilation.  4.  Group B strep positive.     POSTOPERATIVE DIAGNOSES:  1.  Intrauterine pregnancy at 39 weeks.  2.  Chronic hypertension.  3.  Arrest of dilation.  4.  Group B strep positive.  5.  Arrest of descent.     PROCEDURE:  Primary low transverse cervical  section.     PRIMARY SURGEON:  Corinne E. Capurro, MD     ASSISTANT:  Nahid Cline MD     ANESTHESIA:  Epidural anesthesia.     COMPLICATIONS:  None.     PATHOLOGY:  None.     TOTAL ESTIMATED BLOOD LOSS:  Approximately 500 mL.     FINDINGS:  Viable female infant with Apgars of 8 and 9, normal uterus with   bilateral fallopian tubes and ovaries.     DESCRIPTION OF PROCEDURE:  After the patient was taken to the operating room   and her epidural anesthesia was found to be adequate, she was then prepped and   draped in the usual dorsal supine position.  A Pfannenstiel skin incision was   made with a knife.  This incision was carried down through to the underlying   fascia using Bovie cautery.  Fascia was incised and extended laterally using   Treviño scissors.  The rectus fascia was dissected off the underlying rectus   muscle using Bovie cautery.  Peritoneum was entered sharply and extended using   Metzenbaum scissors.  An Levon O layer retractor was then placed into the   abdomen.  Vesicouterine peritoneum was tented up and entered sharply.  This   incision was extended laterally and the bladder flap was created digitally.    The lower uterine segment was then incised with a knife.  This incision was   extended using finger fracture.  Infant's head was delivered atraumatically.    Nose and mouth pulled suctioned.  The rest of infant delivered through   uncomplicated.  Cord was held for 30 seconds, then clumped x2 and cut and   infant was handed over to waiting nursing staff.     Manual removal of intact  placenta with three-vessel cord.  The uterus was   exteriorized and cleared of all clot and debris.  It was then replaced back   into the abdomen.  The lower uterine segment was repaired in running fashion   using 0 Vicryl.  A second imbricating suture of 0 Vicryl was then performed.    Additional sutures were used as needed to obtain hemostasis.  The abdominal   gutters were cleared of all clot and debris.  The Levon O layer retractor was   then removed.  Peritoneum was repaired in a running fashion using 3-0 Vicryl.    The fascia was then repaired in running fashion using 0 Vicryl.    Subcutaneous tissues were copiously irrigated.  These tissues were then   reapproximated using 3-0 Vicryl.  Skin was then closed with 4-0 Monocryl.    Hemostasis assured.  The incision was covered with a Mepilex dressing.  The   patient returned to recovery in stable condition.  Sponge, lap, and needle   counts were correct x3 at the end of the procedure.        ______________________________  Corinne E. Capurro, MD [facility]    DD:  06/24/2025 06:52  DT:  06/24/2025 07:01    Job#:  [id number]

## 2025-06-24 NOTE — ANESTHESIA POSTPROCEDURE EVALUATION
Patient: Jasvir Thompson Maddox    Procedure Summary       Date: 06/23/25 Room / Location: Labor & Delivery Procedure    Anesthesia Start: 0919 Anesthesia Stop: 06/24/25 0634    Procedure: INDUCTION OF LABOR Diagnosis:     Scheduled Providers: Ob Inductions Responsible Provider: Oneil Mcgregor D.O.    Anesthesia Type: epidural ASA Status: 3            Final Anesthesia Type: epidural  Last vitals  BP   Blood Pressure: 128/61    Temp   36.1 °C (96.9 °F)    Pulse   78   Resp   18    SpO2   96 %      Anesthesia Post Evaluation    Patient location during evaluation: PACU  Patient participation: complete - patient participated  Level of consciousness: awake and alert  Pain score: 3    Airway patency: patent  Anesthetic complications: no  Cardiovascular status: hemodynamically stable  Respiratory status: acceptable  Hydration status: euvolemic    PONV: none          No notable events documented.     Nurse Pain Score: 8 (NPRS)

## 2025-06-24 NOTE — ANESTHESIA TIME REPORT
Anesthesia Start and Stop Event Times       Date Time Event    6/23/2025 0917 Ready for Procedure     0919 Anesthesia Start    6/24/2025 0634 Anesthesia Stop          Responsible Staff  06/23/25 to 06/24/25      Name Role Begin End    FARHAN MendiolaO. Anesth 0919 0634          Overtime Reason:  no overtime (within assigned shift)    Comments:

## 2025-06-24 NOTE — LACTATION NOTE
This note was copied from a baby's chart.  MOB is a 24 y/o who delivered a 39 0/7 gestation after presenting for IOL; She delivered by primary C/S for failure to descend. She has medical history of Chronic HTN and Anemia. Per MOB infant latched for 10 minutes in L&D soon after delivery and once besides with offering of breast using HE uriel 2-3 hours. Infant showing cues. With permission, Infant placed skin to skin in football hold making several attempts to latch. She tongue thrusts and chews latching shallow even with mouth opening wide for deep latch. Several attempts made with MOB using hand expression. Infant wrapped and put in open crib per MOB request, stating she started hand expressing to feed 20 minutes before lactation arrived. Teach to call for assist with latch as needed or assessment of latch as infant is feeding at least once per shift. Lactation education as in assessment. MOB voices understanding.     Report to Florin ALCANTARA.

## 2025-06-24 NOTE — DISCHARGE PLANNING
Discharge Planning Assessment Post Partum    Reason for Referral: History of anxiety, depression, SA, THC, and flagged by CPS for mental health concerns.   Address: [address] [place], [place] [address]  [address] [place] [address]  Phone: [phone]  Type of Living Situation: stable housing   Mom Diagnosis: Pregnancy,    Baby Diagnosis: [place]-39 weeks   Primary Language: English     Name of Baby: Trena Maddox (: 25)  Father of the Baby: Karime Maddox (: 00)  Involved in baby’s care? Yes  Contact Information: [phone]    Prenatal Care: Yes, Dr. Smith   Mom's PCP: No PCP listed   PCP for new baby: Pediatrician list provided to parents     Support System: FOB  Coping/Bonding between mother & baby: Yes, FOB holding baby during assessment   Source of Feeding: breast feeding   Supplies for Infant: prepared for infant-states they have a car seat, bassinet, clothes, and diapers    Mom's Insurance: [employer]   Baby Covered on Insurance: Yes  Mother Employed/School: [employer]   Other children in the home/names & ages: first baby     Financial Hardship/Income: No   Mom's Mental status: alert and oriented   Services used prior to admit: Medicaid, WIC, and food stamps     [facility]  Report called in to Flores Bourgeois with [facility] who asked to be notified when baby's UDS results are in.  Psychiatric History: History of depression, anxiety, and SA.  Discussed with mother who stated she is currently in therapy-individual and group and plans to continue to follow-up with her Therapist.  Provided PPD/PPA resources.  Domestic Violence History: No  Drug/ETOH History: History of THC-infant's UDS is pending     Resources Provided: pediatrician list, children and family community resource list, diaper bank referrals, PPD/PPA handout, DNA/Paternity testing resources, and baby bundle of  supplies   Referrals Made: diaper bank referrals and [facility]    Social  Worker Clearance for Discharge: Waiting on baby's UDS results.   [facility].      Ongoing Plan: Continue to follow and [facility].

## 2025-06-24 NOTE — PROGRESS NOTES
"POD# 1    S: doing ok. Worried about milk not coming in. Pain controlled. +flatus and void. Lochia normal.     O:   /84   Pulse 92   Temp 36.6 °C (97.9 °F) (Temporal)   Resp 18   Ht 1.651 m (5' 5\")   Wt 113 kg (250 lb)   SpO2 99%   Gen: NAD  Abdomen: Fundus firm below umbilicus. No TTP  Incision: Mepilex, c/di  Ext: no c/c/e    RH: O positive  GBS: immune  Rubella: positive    Labs:   Recent Labs     25  0507   WBC 10.1 11.9*   RBC 4.57 4.09*   HEMOGLOBIN 11.6* 10.6*   HEMATOCRIT 37.7 33.3*   MCV 82.5 81.4   MCH 25.4* 25.9*   RDW 47.4 47.1   PLATELETCT 385 309   MPV 9.9 10.1   NEUTSPOLYS 72.10*  --    LYMPHOCYTES 21.40*  --    MONOCYTES 5.60  --    EOSINOPHILS 0.20  --    BASOPHILS 0.30  --        A/P 24yo  s/p primary LTCS  CHTN     EBL: 500cc  Complications: none apparent   -  Routine post op care. Discussed working with RN and lactation  CHTN: Pt was previously rx Labetalol, but was not taking the medication at home consistently. No current BP meds. BP is at goal of <150/90 without medication  DVT prevention: Lovenox prophylaxis while inpatient.   Home in 1-2 days     Hannah Shipley MD   "

## 2025-06-25 LAB
ERYTHROCYTE [DISTWIDTH] IN BLOOD BY AUTOMATED COUNT: 47.1 FL (ref 35.9–50)
HCT VFR BLD AUTO: 33.3 % (ref 37–47)
HGB BLD-MCNC: 10.6 G/DL (ref 12–16)
MCH RBC QN AUTO: 25.9 PG (ref 27–33)
MCHC RBC AUTO-ENTMCNC: 31.8 G/DL (ref 32.2–35.5)
MCV RBC AUTO: 81.4 FL (ref 81.4–97.8)
PLATELET # BLD AUTO: 309 K/UL (ref 164–446)
PMV BLD AUTO: 10.1 FL (ref 9–12.9)
RBC # BLD AUTO: 4.09 M/UL (ref 4.2–5.4)
WBC # BLD AUTO: 11.9 K/UL (ref 4.8–10.8)

## 2025-06-25 PROCEDURE — 700111 HCHG RX REV CODE 636 W/ 250 OVERRIDE (IP): Mod: JZ | Performed by: OBSTETRICS & GYNECOLOGY

## 2025-06-25 PROCEDURE — A9270 NON-COVERED ITEM OR SERVICE: HCPCS | Performed by: OBSTETRICS & GYNECOLOGY

## 2025-06-25 PROCEDURE — 85027 COMPLETE CBC AUTOMATED: CPT

## 2025-06-25 PROCEDURE — 700102 HCHG RX REV CODE 250 W/ 637 OVERRIDE(OP): Performed by: OBSTETRICS & GYNECOLOGY

## 2025-06-25 PROCEDURE — 770002 HCHG ROOM/CARE - OB PRIVATE (112)

## 2025-06-25 PROCEDURE — 36415 COLL VENOUS BLD VENIPUNCTURE: CPT

## 2025-06-25 RX ADMIN — OXYCODONE HYDROCHLORIDE 10 MG: 10 TABLET ORAL at 19:44

## 2025-06-25 RX ADMIN — KETOROLAC TROMETHAMINE 15 MG: 15 INJECTION, SOLUTION INTRAMUSCULAR; INTRAVENOUS at 00:05

## 2025-06-25 RX ADMIN — IBUPROFEN 800 MG: 800 TABLET, FILM COATED ORAL at 21:13

## 2025-06-25 RX ADMIN — OXYCODONE 5 MG: 5 TABLET ORAL at 13:53

## 2025-06-25 RX ADMIN — ACETAMINOPHEN 1000 MG: 500 TABLET ORAL at 11:57

## 2025-06-25 RX ADMIN — ACETAMINOPHEN 1000 MG: 500 TABLET ORAL at 23:44

## 2025-06-25 RX ADMIN — ACETAMINOPHEN 1000 MG: 500 TABLET ORAL at 17:59

## 2025-06-25 RX ADMIN — KETOROLAC TROMETHAMINE 15 MG: 15 INJECTION, SOLUTION INTRAMUSCULAR; INTRAVENOUS at 06:04

## 2025-06-25 RX ADMIN — ACETAMINOPHEN 1000 MG: 500 TABLET ORAL at 06:05

## 2025-06-25 ASSESSMENT — PAIN DESCRIPTION - PAIN TYPE
TYPE: ACUTE PAIN;SURGICAL PAIN
TYPE: ACUTE PAIN;SURGICAL PAIN
TYPE: SURGICAL PAIN
TYPE: ACUTE PAIN;SURGICAL PAIN
TYPE: SURGICAL PAIN
TYPE: ACUTE PAIN;SURGICAL PAIN
TYPE: SURGICAL PAIN
TYPE: SURGICAL PAIN
TYPE: ACUTE PAIN;SURGICAL PAIN

## 2025-06-25 NOTE — DISCHARGE PLANNING
:    Infant's UDS is positive for fentanyl.  Discussed with RN who stated MOB received fentanyl during labor prior to delivery.  Notified Flores Bourgeois with [facility] of baby's UDS results.    Plan:  Infant is cleared to discharge home with parents once medically cleared.

## 2025-06-25 NOTE — PROGRESS NOTES
Assessment completed, (abdomen incision dressing is CDI) Fundus is firm, lochia light. Pt ambulating independently. Bands verified matching baby. Needs are met at this time. Encouraged to call with any needs.

## 2025-06-25 NOTE — PROGRESS NOTES
Bedside report received from QUINTON Faulkner at change of shift. Assessment per flowsheet completed. Fundus firm, lochia scant. Pt declined pain interventions at this time. Discussed POC with pt; questions answered. Pt was encouraged to call with needs or concerns.

## 2025-06-25 NOTE — LACTATION NOTE
This note was copied from a baby's chart.  MOB is priming/placing nipple shield for latch.Infant rapidly achieved the deep latch with suck/swallow coordination note. She states she wants to feed often (clusterfeeding), but doesn't latch on the left. Teach to call for assist with latch on the left; educate the necessity of stimulating both breasts for milk production through latch and feeding. MOB voices understanding.

## 2025-06-25 NOTE — CARE PLAN
The patient is Stable - Low risk of patient condition declining or worsening    Shift Goals  Clinical Goals: Lochia WDL  Patient Goals: pain control, ambulation  Family Goals: support    Progress made toward(s) clinical / shift goals:      Problem: Altered Physiologic Condition  Goal: Patient physiologically stable as evidenced by normal lochia, palpable uterine involution and vitals within normal limits  Outcome: Progressing  Note: Lochia remains scant without clots expressed; fundus firm and midline.      Problem: Pain - Standard  Goal: Alleviation of pain or a reduction in pain to the patient’s comfort goal  Outcome: Progressing  Note: Pain has been managed through scheduled and PRN meds.     Problem: Early Mobilization - Post Surgery  Goal: Early mobilization post surgery  Outcome: Progressing  Note: Pt has been ambulating independently.     Patient is not progressing towards the following goals:

## 2025-06-25 NOTE — LACTATION NOTE
This note was copied from a baby's chart.  RN assisted and reports infant latching on the right side for five minutes, but is now alert and trotting, but when positioned to latch, sucks on tongue and trusts breast out of mouth. After several attempts on left, shield used and infant settled into a deep latch with suck/swallow coordination. Shield instruction with handout given. Teach to call for assist with latch as needed or assessment of latch as infant is feeding at least once per shift Lactation education as in assessment. Report shield use to RN. MOB voices understanding.

## 2025-06-25 NOTE — CARE PLAN
The patient is Stable - Low risk of patient condition declining or worsening    Shift Goals  Clinical Goals: Lochia WDL, establish breastfeeding and rest  Patient Goals: bonding with baby  Family Goals: support    Progress made toward(s) clinical / shift goals:    Problem: Knowledge Deficit - Postpartum  Goal: Patient will verbalize and demonstrate understanding of self and infant care  Description: Target End Date:  1-3 days or as soon as patient condition allows    Document in Patient Education    1.  Assess patient and knowledge of self and infant care  2.  Educate patient verbally, by demonstration and written material  Outcome: Progressing     Problem: Psychosocial - Postpartum  Goal: Patient will verbalize and demonstrate effective bonding and parenting behavior  Description: Target End Date:  1 to 4 days    1.  Assess patient for anxiety or apprehension regarding parenting role  2.  Provide emotional support and encouragement to patient/family/caregiver  Outcome: Progressing     Problem: Altered Physiologic Condition  Goal: Patient physiologically stable as evidenced by normal lochia, palpable uterine involution and vitals within normal limits  Description: Target End Date:  1 to 4 days    Document on Assessment flowsheet    1.  Perform physical assessment and obtain vitals per intrapartum/postpartum standards of care  2.  Follow epidural/spinal narcotic protocol if patient has received a long acting narcotic  3.  Massage fundus as necessary to prevent excessive lochia  4.  Administer pitocin, methergine, cytotec or hemabate as ordered  Outcome: Progressing       Patient is not progressing towards the following goals:

## 2025-06-26 PROCEDURE — 700102 HCHG RX REV CODE 250 W/ 637 OVERRIDE(OP): Performed by: OBSTETRICS & GYNECOLOGY

## 2025-06-26 PROCEDURE — A9270 NON-COVERED ITEM OR SERVICE: HCPCS | Performed by: OBSTETRICS & GYNECOLOGY

## 2025-06-26 PROCEDURE — 770002 HCHG ROOM/CARE - OB PRIVATE (112)

## 2025-06-26 PROCEDURE — RXMED WILLOW AMBULATORY MEDICATION CHARGE: Performed by: OBSTETRICS & GYNECOLOGY

## 2025-06-26 RX ORDER — PSEUDOEPHEDRINE HCL 30 MG
100 TABLET ORAL 2 TIMES DAILY
Qty: 60 CAPSULE | Refills: 0 | Status: SHIPPED | OUTPATIENT
Start: 2025-06-26

## 2025-06-26 RX ORDER — FERROUS GLUCONATE 324(38)MG
324 TABLET ORAL
Qty: 30 TABLET | Refills: 1 | Status: SHIPPED | OUTPATIENT
Start: 2025-06-26

## 2025-06-26 RX ORDER — FERROUS GLUCONATE 324(38)MG
324 TABLET ORAL
Status: DISCONTINUED | OUTPATIENT
Start: 2025-06-26 | End: 2025-06-27 | Stop reason: HOSPADM

## 2025-06-26 RX ORDER — IBUPROFEN 800 MG/1
800 TABLET, FILM COATED ORAL EVERY 8 HOURS
Qty: 30 TABLET | Refills: 1 | Status: SHIPPED | OUTPATIENT
Start: 2025-06-26

## 2025-06-26 RX ORDER — OXYCODONE HYDROCHLORIDE 5 MG/1
5 TABLET ORAL EVERY 4 HOURS PRN
Qty: 15 TABLET | Refills: 0 | Status: SHIPPED | OUTPATIENT
Start: 2025-06-26 | End: 2025-07-02

## 2025-06-26 RX ORDER — ACETAMINOPHEN 500 MG
1000 TABLET ORAL EVERY 6 HOURS
Qty: 30 TABLET | Refills: 0 | Status: SHIPPED | OUTPATIENT
Start: 2025-06-26

## 2025-06-26 RX ORDER — DOCUSATE SODIUM 100 MG/1
100 CAPSULE, LIQUID FILLED ORAL 2 TIMES DAILY
Status: DISCONTINUED | OUTPATIENT
Start: 2025-06-26 | End: 2025-06-27 | Stop reason: HOSPADM

## 2025-06-26 RX ADMIN — IBUPROFEN 800 MG: 800 TABLET, FILM COATED ORAL at 22:24

## 2025-06-26 RX ADMIN — OXYCODONE HYDROCHLORIDE 10 MG: 10 TABLET ORAL at 16:04

## 2025-06-26 RX ADMIN — ACETAMINOPHEN 1000 MG: 500 TABLET ORAL at 18:06

## 2025-06-26 RX ADMIN — IBUPROFEN 800 MG: 800 TABLET, FILM COATED ORAL at 06:11

## 2025-06-26 RX ADMIN — FERROUS GLUCONATE 324 MG: 324 TABLET ORAL at 17:09

## 2025-06-26 RX ADMIN — DOCUSATE SODIUM 100 MG: 100 CAPSULE, LIQUID FILLED ORAL at 17:10

## 2025-06-26 RX ADMIN — ACETAMINOPHEN 1000 MG: 500 TABLET ORAL at 11:53

## 2025-06-26 RX ADMIN — IBUPROFEN 800 MG: 800 TABLET, FILM COATED ORAL at 13:34

## 2025-06-26 ASSESSMENT — PAIN DESCRIPTION - PAIN TYPE
TYPE: SURGICAL PAIN
TYPE: SURGICAL PAIN
TYPE: ACUTE PAIN;SURGICAL PAIN
TYPE: ACUTE PAIN
TYPE: ACUTE PAIN;SURGICAL PAIN
TYPE: ACUTE PAIN;SURGICAL PAIN
TYPE: SURGICAL PAIN
TYPE: ACUTE PAIN;SURGICAL PAIN
TYPE: SURGICAL PAIN
TYPE: ACUTE PAIN;SURGICAL PAIN

## 2025-06-26 ASSESSMENT — EDINBURGH POSTNATAL DEPRESSION SCALE (EPDS)
I HAVE LOOKED FORWARD WITH ENJOYMENT TO THINGS: AS MUCH AS I EVER DID
THINGS HAVE BEEN GETTING ON TOP OF ME: NO, MOST OF THE TIME I HAVE COPED QUITE WELL
I HAVE BEEN ABLE TO LAUGH AND SEE THE FUNNY SIDE OF THINGS: AS MUCH AS I ALWAYS COULD
I HAVE BLAMED MYSELF UNNECESSARILY WHEN THINGS WENT WRONG: YES, SOME OF THE TIME
THE THOUGHT OF HARMING MYSELF HAS OCCURRED TO ME: NEVER
I HAVE BEEN SO UNHAPPY THAT I HAVE HAD DIFFICULTY SLEEPING: NOT AT ALL
I HAVE BEEN SO UNHAPPY THAT I HAVE BEEN CRYING: NO, NEVER
I HAVE BEEN ANXIOUS OR WORRIED FOR NO GOOD REASON: HARDLY EVER
I HAVE FELT SAD OR MISERABLE: NOT VERY OFTEN
I HAVE FELT SCARED OR PANICKY FOR NO GOOD REASON: NO, NOT MUCH

## 2025-06-26 NOTE — CARE PLAN
The patient is Stable - Low risk of patient condition declining or worsening    Shift Goals  Clinical Goals: VSS, fundus firm, lochia light  Patient Goals: bond, rest, pain control  Family Goals: Support    Progress made toward(s) clinical / shift goals:    Problem: Psychosocial - Postpartum  Goal: Patient will verbalize and demonstrate effective bonding and parenting behavior  Outcome: Progressing  Note: Patient participating in all cares for infant and responding to cues. Encouraged to voice all feelings and concerns.      Problem: Altered Physiologic Condition  Goal: Patient physiologically stable as evidenced by normal lochia, palpable uterine involution and vitals within normal limits  Outcome: Progressing  Note: Fundus firm, lochia light. VSS.        Patient is not progressing towards the following goals:

## 2025-06-26 NOTE — LACTATION NOTE
This note was copied from a baby's chart.  KHURRAM is now following a three step plan. States latching well with shield then she pumps and feeds back; total weigh loss @6.05%. She will call [facility] today for infant enrollment and teach to ask for pump due to three step plan with shield. Teach to call for assist with latch as needed or assessment of latch as infant is feeding at least once per shift.Review lactation education with voiced understanding.     KHURRAM has a manual pump and will be inquiring about a pump from [facility]. Teach to ask for 1:1 consultation through [facility] and to attend the BF Circles. NNBF resource reviewed.     Breastfeeding plan:     Feed on Three step plan as follows: offer breast every 3 hours or more often on cue, pump every 3 hours, and supplement according to guidelines given after breastfeeding.      Teach signs that infant is getting enough as transitioning  stools to bright yellow/green seedy loose stools by day 5.      Follow up with PEDS PCP as scheduled      Call for breastfeeding for 1:1 lactation consultation through  [facility]  and attend the BF Circles without need for appointment. Inquiry for peer support through [facility].

## 2025-06-26 NOTE — CARE PLAN
The patient is Stable - Low risk of patient condition declining or worsening    Shift Goals  Clinical Goals: lochia light. inision clean and dry  Patient Goals: bond, rest, pain control  Family Goals: Support    Progress made toward(s) clinical / shift goals:  vss ambulating. Passing flatus. Incision clean and dry    Patient is not progressing towards the following goals:

## 2025-06-26 NOTE — PROGRESS NOTES
Received bedside report from dayshift RN, pt care assumed. Pt awake and alert in bed on room air with  and FOB at bedside. Pt AAOx4, VSS, pain 7/10. Bed locked and in lowest position, call light in reach. Encouraged to call with any needs/questions/concerns.

## 2025-06-26 NOTE — PROGRESS NOTES
Progress Note    Jasvir Maddox   Post-op day 2  Chief Admitting Dx: Labor and delivery indication for care or intervention [O75.9]  Delivery Type:  for arrest of dilation/descent      Subjective:  Ambulating: voiding, tolerating diet, normal lochia, Pain controlled w/ po meds    Vitals:    25 0604 25 1600 25 1845 25 0600   BP: 137/84 139/84 132/83 137/80   Pulse: 92 (!) 102 86 84   Resp: 18 20 18 18   Temp: 36.6 °C (97.9 °F) 36.8 °C (98.2 °F) 36.9 °C (98.4 °F) 36.9 °C (98.4 °F)   TempSrc: Temporal Temporal Temporal Temporal   SpO2: 99% 98% 97% 96%   Weight:       Height:           Exam:  Gen: no acute distress  Abdomen:soft nt/nd firm fundus  Inc: c/d/I with postop dressing  Ext: no calf pain bilateral LE    Labs:   No results found for this or any previous visit (from the past 24 hours).      Assessment:  POD 2 s/p primary c/s    Plan:  Routine postop   Ambulate  Regular diet  Work on breastfeeding  Desires dc home today.     Liza Aguilera M.D.

## 2025-06-26 NOTE — PROGRESS NOTES
0145: Received report from Celina ALCANTARA, assumed care of patient at this time.     0230: Assessment completed. Fundus firm, lochia light. Abdominal incision with island dressing clean, dry, intact. Plan of care reviewed, verbalized understanding. Call light within reach, will call if pain intervention needed.

## 2025-06-26 NOTE — DISCHARGE SUMMARY
Discharge Summary:      Jasvir Maddox    Admit Date:   2025  Discharge Date:  2025     Admitting diagnosis:  Labor and delivery indication for care or intervention [O75.9]  Chronic hypertension  Maternal obesity    Discharge Diagnosis: Status post  for failure to progress.     History:  Past Medical History[1]  OB History    Para Term  AB Living   1 1 1   1   SAB IAB Ectopic Molar Multiple Live Births       0 1      # Outcome Date GA Lbr Juan Miguel/2nd Weight Sex Type Anes PTL Lv   1 Term 25 39w0d  2.81 kg (6 lb 3.1 oz) F CS-LTranv EPI  LAURY      Complications: Failure to Progress in First Stage        Augmentin [amoxicillin-pot clavulanate], Iodine contrast [diagnostic x-ray materials], and Sulfa drugs  There are no active problems to display for this patient.       Hospital Course:   25 y.o. , now para 1, was admitted with the above mentioned diagnosis, underwent Induction of Labor,for chronic hypertension. She ended up having arrest of dilation and arrest of descent and underwent a primary  section. Patient postpartum course was unremarkable, with progressive advancement in diet , ambulation and toleration of oral analgesia. Patient without complaints today and desires discharge.      Vitals:    25 0604 25 1600 25 1845 25 0600   BP: 137/84 139/84 132/83 137/80   Pulse: 92 (!) 102 86 84   Resp: 18 20 18 18   Temp: 36.6 °C (97.9 °F) 36.8 °C (98.2 °F) 36.9 °C (98.4 °F) 36.9 °C (98.4 °F)   TempSrc: Temporal Temporal Temporal Temporal   SpO2: 99% 98% 97% 96%   Weight:       Height:           Current Facility-Administered Medications   Medication Dose    lactated ringers infusion  2,000 mL    ibuprofen (Motrin) tablet 800 mg  800 mg    Followed by    [START ON 2025] ibuprofen (Motrin) tablet 800 mg  800 mg    oxyCODONE immediate-release (Roxicodone) tablet 5 mg  5 mg    oxyCODONE immediate release (Roxicodone) tablet 10 mg  10  mg    ondansetron (Zofran) syringe/vial injection 4 mg  4 mg    Or    ondansetron (Zofran ODT) dispertab 4 mg  4 mg    diphenhydrAMINE (Benadryl) tablet/capsule 25 mg  25 mg    Or    diphenhydrAMINE (Benadryl) injection 25 mg  25 mg    tetanus-dipth-acell pertussis (Tdap) inj 0.5 mL  0.5 mL    measles, mumps and rubella vaccine (Mmr) injection 0.5 mL  0.5 mL    simethicone (Mylicon) chewable tablet 125 mg  125 mg    lactated ringers infusion      acetaminophen (Tylenol) tablet 1,000 mg  1,000 mg    Followed by    [START ON 6/27/2025] acetaminophen (Tylenol) tablet 1,000 mg  1,000 mg    scopolamine (Transderm-Scop) patch 1 Patch  1 Patch    oxytocin (Pitocin) 0.02 Units/mL  0-20 shameka-units/min    ropivacaine 0.2 % (Naropin) injection      LR infusion      oxytocin (Pitocin) infusion (for post delivery)  125 mL/hr       Exam:  See todays progress note for exam    Labs:  Recent Labs     06/25/25  0507   WBC 11.9*   RBC 4.09*   HEMOGLOBIN 10.6*   HEMATOCRIT 33.3*   MCV 81.4   MCH 25.9*   MCHC 31.8*   RDW 47.1   PLATELETCT 309   MPV 10.1        Activity:   Discharge to home  Pelvic Rest x 6 weeks  No heavy lifting/pulling pushing  Keep dressing on for at least a week.     Assessment:s/p primary c/section  Follow up: .1-2 week for incision check.   Capurro       Discharge Meds:   Current Outpatient Medications   Medication Sig Dispense Refill    acetaminophen (TYLENOL) 500 MG Tab Take 2 Tablets by mouth every 6 hours. 30 Tablet 0    ibuprofen (MOTRIN) 800 MG Tab Take 1 Tablet by mouth every 8 hours. 30 Tablet 1    oxyCODONE immediate-release (ROXICODONE) 5 MG Tab Take 1 Tablet by mouth every four hours as needed for Severe Pain for up to 5 days. Indications: Acute Pain 15 Tablet 0     Continue pnv and stool softeners.  And iron supplements.       Liza Aguilera M.D.               [1]   Past Medical History:  Diagnosis Date    Anemia     Asthma     Pt states she had as achild, but states it has resolved.    Chlamydia      Polycystic ovary syndrome

## 2025-06-27 ENCOUNTER — PHARMACY VISIT (OUTPATIENT)
Dept: PHARMACY | Facility: MEDICAL CENTER | Age: 25
End: 2025-06-27
Payer: MEDICARE

## 2025-06-27 VITALS
DIASTOLIC BLOOD PRESSURE: 98 MMHG | BODY MASS INDEX: 41.65 KG/M2 | RESPIRATION RATE: 17 BRPM | SYSTOLIC BLOOD PRESSURE: 146 MMHG | TEMPERATURE: 97.7 F | HEIGHT: 65 IN | HEART RATE: 79 BPM | OXYGEN SATURATION: 98 % | WEIGHT: 250 LBS

## 2025-06-27 PROBLEM — G89.18 ACUTE POST-OPERATIVE PAIN: Status: ACTIVE | Noted: 2025-06-27

## 2025-06-27 PROBLEM — Z98.891 S/P CESAREAN SECTION: Status: ACTIVE | Noted: 2025-06-27

## 2025-06-27 PROCEDURE — A9270 NON-COVERED ITEM OR SERVICE: HCPCS | Performed by: OBSTETRICS & GYNECOLOGY

## 2025-06-27 PROCEDURE — 700102 HCHG RX REV CODE 250 W/ 637 OVERRIDE(OP): Performed by: OBSTETRICS & GYNECOLOGY

## 2025-06-27 RX ADMIN — FERROUS GLUCONATE 324 MG: 324 TABLET ORAL at 08:29

## 2025-06-27 RX ADMIN — ACETAMINOPHEN 1000 MG: 500 TABLET ORAL at 05:48

## 2025-06-27 RX ADMIN — DOCUSATE SODIUM 100 MG: 100 CAPSULE, LIQUID FILLED ORAL at 05:48

## 2025-06-27 RX ADMIN — IBUPROFEN 800 MG: 800 TABLET, FILM COATED ORAL at 05:48

## 2025-06-27 ASSESSMENT — PAIN DESCRIPTION - PAIN TYPE
TYPE: SURGICAL PAIN
TYPE: ACUTE PAIN;SURGICAL PAIN

## 2025-06-27 NOTE — PROGRESS NOTES
1930: Assessment completed. Fundus firm, lochia light. Abdominal incision with island silver dressing clean, dry, intact. Plan of care reviewed, verbalized understanding. Call light within reach, will call if pain intervention needed.     0548: MD Oliver at bedside, notified of patient /98. Per MD, ok to discharge home as long as BPs remain under 150s systolic and no new orders at this time.

## 2025-06-27 NOTE — LACTATION NOTE
This note was copied from a baby's chart.  Follow up lactation support t: Mom has a feeding plan in place and states baby is taken her EXBM well and she pumps about 40 mls. Baby is in mother's arms and mom is in chair.  Mom reports that baby can latch with nipples shield but it has become  alittle more difficult since bottles.   Mom has the supplemental feeding guidelines.  Discussed follow ing the  3 step plan and  observe for changes in output.  Mom has pump at home and is enrolled in WIC.  LC asked if mom could assist her in any way and mother denied any needs.

## 2025-06-27 NOTE — PROGRESS NOTES
1020: Discharge education provided to patient, including follow up information and discharge medication. All questions answered at this time, patient verbalizes understanding. Paperwork signed and dated at this time, including narcotic consent form.      1030: Patient discharged from unit, escorted by staff.

## 2025-06-27 NOTE — CARE PLAN
The patient is Stable - Low risk of patient condition declining or worsening    Shift Goals  Clinical Goals: VSS, fundus firm, lochia light  Patient Goals: bond, breastfeed, rest, pain control  Family Goals: Support    Progress made toward(s) clinical / shift goals:    Problem: Psychosocial - Postpartum  Goal: Patient will verbalize and demonstrate effective bonding and parenting behavior  Outcome: Progressing  Note: Patient participating in all cares for infant and responding to cues. Engaging in skin to skin. Encouraged to voice all feelings and concerns.      Problem: Altered Physiologic Condition  Goal: Patient physiologically stable as evidenced by normal lochia, palpable uterine involution and vitals within normal limits  Outcome: Progressing  Note: Fundus firm, lochia light. VSS.        Patient is not progressing towards the following goals:

## 2025-06-27 NOTE — DISCHARGE SUMMARY
"Discharge Summary    Admission Date: 6/22/2025    Discharge Date: 6/27/2025    Diagnosis:  S/P PLTCS  Acute postoperative pain    Subjective: Pain controlled. Normal lochia. Eating, voiding and ambulating without difficulty.  Working on breast-feeding.  Her blood pressure is going up but still nonsevere.  She denies headache, visual disturbances, upper abdominal pain, or significant lower extremity swelling.  She was on labetalol during her pregnancy but had symptoms as it rapidly dropped her blood pressure.  If she needs to restart a medication labetalol would be her preference.    BP (!) 141/88 Comment: Rn notified   Pulse 87   Temp 36.6 °C (97.8 °F) (Temporal)   Resp 18   Ht 1.651 m (5' 5\")   Wt 113 kg (250 lb)   LMP 09/27/2024 (Exact Date) Comment: Positive pregnancy on pregnancy home kit  SpO2 96%   Breastfeeding Yes   BMI 41.60 kg/m²     GEN: NAD  GI:soft, NT, ND, incision is clean, dry, and intact with Mepilex dressing in place  :fundus firm and below umbilicus  EXT:no edema    Hospital Course:Patient is a 25-year-old G1, P0 who was admitted at 38 weeks and 1 day for induction of labor for chronic hypertension.  She is status post PLTCS with delivery of a viable female infant with Apgars of 8 and 9 on 6/24/2025 for arrest of dilation.   cc.  No complications.  She was meeting all post operative goals and was stable for discharge home on postoperative day 3.    Discharge Instructions   1. Diet : general  2. Activity:   No heavy lifting, pushing, pulling more than 10 lbs for 6 weeks  Pelvic rest for 6 weeks  No driving while on narcotics.   Keep incision clean and dry. Wash with soap and water      Current Outpatient Medications   Medication Sig Dispense Refill    acetaminophen (TYLENOL) 500 MG Tab Take 2 Tablets by mouth every 6 hours. 30 Tablet 0    ibuprofen (MOTRIN) 800 MG Tab Take 1 Tablet by mouth every 8 hours. 30 Tablet 1    oxyCODONE immediate-release (ROXICODONE) 5 MG Tab Take 1 " Tablet by mouth every four hours as needed for Severe Pain for up to 5 days. Indications: Acute Pain 15 Tablet 0    docusate sodium 100 MG Cap Take 1 capsule by mouth 2 times a day. 60 Capsule 0    ferrous gluconate (FERGON) 324 (38 Fe) MG Tab Take 1 Tablet by mouth every morning with breakfast. 30 Tablet 1        Follow up: 1 week for BP check    Complications:none    Keisha Oliver M.D.

## 2025-06-27 NOTE — CARE PLAN
The patient is Stable - Low risk of patient condition declining or worsening    Shift Goals  Clinical Goals: patient will remain clinically stable  Patient Goals:   Family Goals:     Progress made toward(s) clinical / shift goals:      Problem: Altered Physiologic Condition  Goal: Patient physiologically stable as evidenced by normal lochia, palpable uterine involution and vitals within normal limits  Outcome: Progressing     Problem: Early Mobilization - Post Surgery  Goal: Early mobilization post surgery  Outcome: Progressing       Patient is not progressing towards the following goals:

## 2025-06-27 NOTE — DISCHARGE INSTRUCTIONS

## 2025-06-27 NOTE — PROGRESS NOTES
0829: Assumed care of patient, assessment completed. Fundus is firm at two below umbilicus with scant lochia rubra. Patient reports voiding, stooling, and ambulating without difficulty. Patient reports 0/10 pain at this time. Personal belongings and call light within reach. Plan of care discussed, patient verbalized understanding.

## 2025-07-20 ENCOUNTER — LACTATION ENCOUNTER (OUTPATIENT)
Dept: PEDIATRICS | Facility: MEDICAL CENTER | Age: 25
End: 2025-07-20

## 2025-07-21 NOTE — LACTATION NOTE
This note was copied from a baby's chart.  Lactation Consultation:    Met with Trena and her mother, Jasvir to provide lactation support. Trena has been readmitted to the hospital for hyperbilirubinemia.     Jasvir states that she has been breastfeeding and pumping with the feed back of expressed milk. Baby has been somewhat slow to gain weight; and required implementation of three-step feeding plan with nipple shield use during initial hospital stay. Jasvir has been able to wean baby from the nipple shield and Trena is currently 7% above birth weight at 3.5 weeks old.  Jasvir reports a robust milk supply, pumping approximately 90-120mL when expressing for a bottle. She does have concerns that baby will often latch shallowly at breast, which causes nipple discomfort.     Infant presents with feeding cues. With initial latch attempt, Trena grasps nipple only. Jasvir is able to recognize this as a shallow/sub-optimal latch, and infant is removed from breast. With minor position modification, (using nipple-to-nose and breast wedging techniques) Trena is able to deeply latch with next attempt. Jasvir notes this to be much more comfortable. Rhythmic, nutritive suck pattern appreciated, with audible swallows noted.    Infant averaging gain of 30 grams/day since hospital admission. Jasvir reports that infant has been primarily feeding directly from breast since having arrived at hospital. She desires to continue with exclusive breast feeding, if possible.    Current Feeding Plan:    Continue with cue-based breastfeeding, at least once every three hours. Focus on achievement of deep latch with each feeding. If unable to achieve optimal feed at breast, pump breast and offer supplement.    Recommend re-evaluation of infant's daily weight gain tomorrow. If gain is appropriate, continue with exclusive breastfeeding. If gain is less than optimal, perform weighted feed and review need for supplemental feedings.    Jasvir  is encouraged to request lactation re-consultation, as desired, throughout hospital stay.

## 2025-07-22 ENCOUNTER — LACTATION ENCOUNTER (OUTPATIENT)
Dept: PEDIATRIC ICU | Facility: MEDICAL CENTER | Age: 25
End: 2025-07-22

## (undated) DEVICE — PENCIL ELECTSURG 10FT HLSTR - WITH BLADE (50EA/CA)

## (undated) DEVICE — TUBING CLEARLINK DUO-VENT - C-FLO (48EA/CA)

## (undated) DEVICE — PACK C-SECTION (2EA/CA)

## (undated) DEVICE — WATER IRRIGATION STERILE 1000ML (12EA/CA)

## (undated) DEVICE — SET EXTENSION WITH 2 PORTS (48EA/CA) ***PART #2C8610 IS A SUBSTITUTE*****

## (undated) DEVICE — CANNULA O2 COMFORT SOFT EAR ADULT 7 FT TUBING (50/CA)

## (undated) DEVICE — SPONGE SURGICAL PVP IODINE L8 IN (20EA/CA)

## (undated) DEVICE — SUTURE3-0 36IN VCRLY PLS ANTI (36PK/BX)

## (undated) DEVICE — SODIUM CHL IRRIGATION 0.9% 1000ML (12EA/CA)

## (undated) DEVICE — STAPLER SKIN INSORB (6EA/BX)

## (undated) DEVICE — PAD LAP STERILE 18 X 18 - (5/PK 40PK/CA)

## (undated) DEVICE — PACK ROOM TURNOVER L&D (12/CA)

## (undated) DEVICE — DRESSING POST OP BORDER 4 X 10 (5EA/BX)

## (undated) DEVICE — KIT SKIN NOSE AND MOUTH PRE-OP (20/CA)

## (undated) DEVICE — SLEEVE VASO DVT COMPRESSION CALF MED - (10PR/CA)

## (undated) DEVICE — SUTURE 0 VICRYL PLUS CTX - 36 INCH (36/BX)

## (undated) DEVICE — BLANKET STERILE CHICKIE FOR L&D (100/CA)

## (undated) DEVICE — HEAD HOLDER JUNIOR/ADULT

## (undated) DEVICE — MAT PATIENT POSITIONING PREVALON (10EA/CA)

## (undated) DEVICE — GLOVE BIOGEL SZ 6 PF LATEX - (50EA/BX 4BX/CA)

## (undated) DEVICE — RETRACTOR O C SECTION LRY - (5/BX)

## (undated) DEVICE — CATHETER IV NON-SAFETY 18 GA X 1 1/4 (50/BX 4BX/CA)

## (undated) DEVICE — CANISTER SUCTION 3000ML MECHANICAL FILTER AUTO SHUTOFF MEDI-VAC NONSTERILE LF DISP (40EA/CA)

## (undated) DEVICE — ELECTRODE DUAL RETURN W/ CORD - (50/PK)

## (undated) DEVICE — KIT  I.V. START (100EA/CA)

## (undated) DEVICE — CANISTER SUCTION RIGID RED 1500CC (40EA/CA)

## (undated) DEVICE — TRAY SPINAL ANESTHESIA NON-SAFETY (20/CA)

## (undated) DEVICE — CHLORAPREP 26 ML APPLICATOR - ORANGE TINT(25/CA)

## (undated) DEVICE — SUTURE 3-0 VICRYL PLUS CT-1 - 36 INCH (36/BX)

## (undated) DEVICE — BAG SPONGE COUNT 10.25 X 32 - BLUE (250/CA)

## (undated) DEVICE — BLANKET UNDERBODY ADULT - (10/CA)

## (undated) DEVICE — SOLUTION PLASMA-LYTE PH 7.4 INJ 1000ML (14EA/CA)